# Patient Record
Sex: FEMALE | Race: WHITE | URBAN - METROPOLITAN AREA
[De-identification: names, ages, dates, MRNs, and addresses within clinical notes are randomized per-mention and may not be internally consistent; named-entity substitution may affect disease eponyms.]

---

## 2017-11-09 ENCOUNTER — OFFICE VISIT (OUTPATIENT)
Dept: ORTHOPEDIC SURGERY | Age: 57
End: 2017-11-09

## 2017-11-09 DIAGNOSIS — M79.604 RIGHT LEG PAIN: Primary | ICD-10-CM

## 2017-11-09 PROCEDURE — 99243 OFF/OP CNSLTJ NEW/EST LOW 30: CPT | Performed by: INTERNAL MEDICINE

## 2017-11-09 PROCEDURE — E0100 CANE ADJUST/FIXED WITH TIP: HCPCS | Performed by: INTERNAL MEDICINE

## 2017-11-10 NOTE — PROGRESS NOTES
Chief Complaint:   Chief Complaint   Patient presents with    Leg Pain     New. R thigh pain          History of Present Illness:       Patient is a 59 y.o. female presents with the above complaint. Patient is seen in consultation at the request of Polo Palomares CNP. The symptoms began January 2017 and started without an injury. The patient describes a aching pain that does radiate. The symptoms are constant  and are are worsening since the onset. She localizes pain to the medial compartment region of the right thigh and her symptoms are predictably worsen with weightbearing and alleviated with recumbency. She has been unable to work consecutive days as a  since sometime in September secondary to his claim pain levels and mild disability. She rates the pain as 7/10 severity workup to date has included x-rays of the right femur and right knee x-rays are not available for direct review however report is detailed below referencing mild tricompartmental osteoarthrosis of the right knee and mild right hip arthrosis    The patient denies any new onset or progressive weakness of the lower extremity she denies any neuritic character symptoms and denies any associated back pain. There is been no local bruising or swelling of the muscle compartment in this region. MRI is contraindicated in her case relative to sacral nerve stimulator. She denies any new onset bowel or bladder dysfunction she has no history of recent or remote pelvic or lumbar spinal trauma    She denies any mechanical symptoms or subjective instability no significant component of rest stiffness about the hip or knee joints.   Despite trials of Naprosyn and intermittent use of Robaxin symptoms remain problematic she will like to consider other options for treatment    She denies any constitutional symptoms     Past Medical History:        Past Medical History:   Diagnosis Date    Hyperlipidemia     Thyroid disease          Past tenderness over the medial compartment of the thigh and this apparently reproduces her pain however there is no palpable induration hematoma or rent within the abductor tendon complex      Range of Motion:  Full range and symmetric mild pain localizing to the medial side with internal and external rotation      Strength:  Hip flexor strength normal low-grade discomfort-right      Special Tests:  Stinchfield test positive      Skin: There are no rashes, ulcerations or lesions. Gait: Minimally antalgic      Reflex intact lower     Additional Comments:        Additional Examinations:           Lower Back: Examination of the lower back does not show any tenderness, deformity or injury. Range of motion is unremarkable. There is no gross instability. There are no rashes, ulcerations or lesions. Strength and tone are normal.   Neurolgic -Light touch sensation and manual muscle testing normal L2-S1. No fasiculations. Pattella tendon and Achilles tendon reflexes +2 bilaterally. Seated SLR negative          Office Imaging Results/Procedures PerformedToday:             Office Procedures:     Orders Placed This Encounter   Procedures    Cane Medline     Patient was prescribed a Medline Cane. This mobility device is required for the following reasons:    Patient has mobility limitations that significantly impair their ability to participate in one or more mobility related activities of daily living. The patient is able to safely use the mobility device. Functional mobility deficit can be sufficiently resolved with the use of this device. Verbal and written instructions for the use of and application of this item were provided. The patient was educated and fit by a healthcare professional with expert knowledge and specialization in brace application while under the direct supervision of the treating physician.   They were instructed to contact the office immediately should the equipment result in increased pain, given the long-standing duration of symptoms. I think it is highly probable that the main pain generator is of intra-articular etiology involving the right hip. Weightbearing x-rays were performed to assess the right hip on follow-up and we will proceed with diagnostic/therapeutic injection of the right hip   If this diagnostic test is negative then we will consider a radicular etiology of her pain and obtain EMG and or lumbar spine CT scan    The nature of the finding, probable diagnosis and likely treatment was thoroughly discussed with the patient. The options, risks, complications, alternative treatment as well as some of the differential diagnosis was discussed. The patient was thoroughly informed and all questions were answered. the patient indicated understanding and satisfaction with the discussion. Orders:        Orders Placed This Encounter   Procedures    Cane Medline     Patient was prescribed a Medline Cane. This mobility device is required for the following reasons:    Patient has mobility limitations that significantly impair their ability to participate in one or more mobility related activities of daily living. The patient is able to safely use the mobility device. Functional mobility deficit can be sufficiently resolved with the use of this device. Verbal and written instructions for the use of and application of this item were provided. The patient was educated and fit by a healthcare professional with expert knowledge and specialization in brace application while under the direct supervision of the treating physician. They were instructed to contact the office immediately should the equipment result in increased pain, decreased sensation, increased swelling or worsening of the condition. Disclaimer: \"This note was dictated with voice recognition software. Though review and correction are routine, we apologize for any errors. \"

## 2017-11-13 ENCOUNTER — TELEPHONE (OUTPATIENT)
Dept: ORTHOPEDIC SURGERY | Age: 57
End: 2017-11-13

## 2017-11-14 ENCOUNTER — TELEPHONE (OUTPATIENT)
Dept: ORTHOPEDIC SURGERY | Age: 57
End: 2017-11-14

## 2017-11-14 ENCOUNTER — OFFICE VISIT (OUTPATIENT)
Dept: ORTHOPEDIC SURGERY | Age: 57
End: 2017-11-14

## 2017-11-14 DIAGNOSIS — M25.551 RIGHT HIP PAIN: ICD-10-CM

## 2017-11-14 DIAGNOSIS — M16.11 PRIMARY OSTEOARTHRITIS OF RIGHT HIP: Primary | ICD-10-CM

## 2017-11-14 DIAGNOSIS — S76.019A HIP STRAIN, INITIAL ENCOUNTER: ICD-10-CM

## 2017-11-14 PROCEDURE — 99213 OFFICE O/P EST LOW 20 MIN: CPT | Performed by: INTERNAL MEDICINE

## 2017-11-14 PROCEDURE — 20611 DRAIN/INJ JOINT/BURSA W/US: CPT | Performed by: INTERNAL MEDICINE

## 2017-11-14 PROCEDURE — 73502 X-RAY EXAM HIP UNI 2-3 VIEWS: CPT | Performed by: INTERNAL MEDICINE

## 2017-11-14 NOTE — PROGRESS NOTES
Chief Complaint:   Chief Complaint   Patient presents with    Hip Pain     f/u R. hip/thigh pain. CSI inj          History of Present Illness:       Patient is a 59 y.o. female presents with the above complaint. Patient returns in follow-up and overall symptoms show no appreciable change. No new injuries no new events she continues to localize pain about the medial growing medial thigh symptoms are typically worsened/perfectly worsened with weightbearing activity. No new injuries no new events    She denies any recent constitutional symptoms  She has no history of coagulopathy       Past Medical History:        Past Medical History:   Diagnosis Date    Hyperlipidemia     Thyroid disease          Past Surgical History:   Procedure Laterality Date    CYSTOSCOPY  2/25/13    CYSTOSCOPY URETHRAL DILATATION HYDRODISTENTION OF BLADDER    KIDNEY REMOVAL Left          Present Medications:         Current Outpatient Prescriptions   Medication Sig Dispense Refill    levothyroxine (SYNTHROID) 88 MCG tablet Take 88 mcg by mouth Daily.  atorvastatin (LIPITOR) 40 MG tablet Take 40 mg by mouth daily.  citalopram (CELEXA) 20 MG tablet Take 20 mg by mouth daily. No current facility-administered medications for this visit. Allergies: Allergies   Allergen Reactions    Phenergan [Promethazine Hcl]      Out of my head \"loopY\"    Sulfa Antibiotics Rash        Social History:         Social History     Social History    Marital status:      Spouse name: N/A    Number of children: N/A    Years of education: N/A     Occupational History    Not on file.      Social History Main Topics    Smoking status: Never Smoker    Smokeless tobacco: Never Used    Alcohol use No    Drug use: No    Sexual activity: Yes     Partners: Male     Other Topics Concern    Not on file     Social History Narrative    No narrative on file        Review of Symptoms:    Pertinent items are noted in HPI   b   Vital Signs: There were no vitals filed for this visit. General Exam:     Constitutional: Patient is adequately groomed with no evidence of malnutrition          Physical Exam: right hip       Primary Exam:    Inspection:  No deformity atrophy or appreciableeffusion      Palpation:  No focal tenderness      Range of Motion:  Stable change from previous mild pain with flexion/abduction-FADIR reproducing her medial thigh pain      Strength:  Stable change from previous      Special Tests:  Stinchfield test positive, FADIR positive      Skin: There are no rashes, ulcerations or lesions. Gait: minimally antalgic      Neurovascular - non focal and intact       Additional Comments:        Additional Examinations:                  Office Imaging Results/Procedures PerformedToday:      Radiology:      X-rays obtained and reviewed in office:   Views 2 views   Location right hip   Impression there is suggestion of mild asymmetric joint space narrowing centrally at the femoral acetabular joint on the right as compared to left. Evidence of sacral nerve stimulator overlying the right hemipelvis. Surgical staples noted left hemipelvis. Anterior posterior columns of the acetabulum have a normal radiographic appearance normal trabecular pattern of the femur negative crossover sign. No stigmata of VALENTIN or DDH       Office Procedures:     Orders Placed This Encounter   Procedures    XR HIP RIGHT (2-3 VIEWS)     03049     Order Specific Question:   Reason for exam:     Answer:   Pain       Ultrasound-guided intra-articular hip injection    Logic E ultrasound  4 MHz    The patient was positioned supine on examination table and the  right    lower extremity was slightly abducted. The curvilinear probe was positioned in the medial groin region transversely. Diagnostic ultrasound was performed verifying the position of the neurovascular bundle in short axis with [place] confirmation.     The probe was then translated laterally and the anterior surface of the femoral head was visualized. The probe was then rotated clockwise to coronal oblique position and the femoral acetabular joint and anterior joint recess at the femoral head/neck junction was visualized. The femoral head neck junction was visualized at approximately 6 cm. Image optimization was obtained    The position of the probe was marked. Sterile preparation was performed. The subcutaneous tissues were anesthetized using 25-gauge 4 cm needle advanced under direct guidance. The needle was then exchanged for a 22-gauge spinal needle and this was advanced under direct guidance using longitudinal technique to the anterior joint recess. A total of 9 mL of Marcaine was utilized. The syringe was exchanged and under direct guidance 2 mL of Celestone 3 mL of Marcaine was injected into the anterior joint recess at the femoral acetabular joint. The anterior capsule was visualized to hydrodissect. Needle was withdrawn pressure applied to the puncture wound. Technically successful intra-articular injection of the hip    Definite and aesthetic response postinjection    Other Outside Imaging and Testing Personally Reviewed:       none          Assessment   Impression: . Encounter Diagnoses   Name Primary?  Primary osteoarthritis of right hip Yes    Hip strain, initial encounter     Right hip pain               Plan:        postinjection protocol  Activity modification cautioned against overuse  Continue medical pain management as per previous with scheduled NSAIDs discontinue after 5 days to when necessary use only  Allow her to return/attempted full  Duty return to work next Wednesday follow-up 10-14 days  Formal course of PT right hip when necessary pending her response to the injection        Are all the main pain generator appears to be consistent  With intra-articular etiology right femoral acetabular joint proceed as outlined above.       The nature of the finding, probable diagnosis and likely treatment was thoroughly discussed with the patient. The options, risks, complications, alternative treatment as well as some of the differential diagnosis was discussed. The patient was thoroughly informed and all questions were answered. the patient indicated understanding and satisfaction with the discussion. Orders:        Orders Placed This Encounter   Procedures    XR HIP RIGHT (2-3 VIEWS)     [id number]     Order Specific Question:   Reason for exam:     Answer:   Pain           Disclaimer: \"This note was dictated with voice recognition software. Though review and correction are routine, we apologize for any errors. \"

## 2017-11-14 NOTE — LETTER
[facility]  [address] [address]  Phone: [phone]  Fax: [phone]    Galilea Lindsay MD        November 14, 2017     Patient: Karolina De Los Santos   YOB: 1960   Date of Visit: 11/14/2017       To Whom It May Concern: It is my medical opinion that Karolina De Los Santos should remain out of work until 11/21/17. May return to full duty on 11/22/17. If you have any questions or concerns, please don't hesitate to call.     Sincerely,      Caleb Sam MD.

## 2017-11-14 NOTE — LETTER
[facility]  [address] [address]  Phone: [phone]  Fax: [phone]    Chacha Braga MD        November 14, 2017     Patient: Ryan Mccrary   YOB: 1960   Date of Visit: 11/14/2017       To Whom It May Concern: It is my medical opinion that Ryan Mccrary should remain out of work until 11/22/17. May return to [place] [address] on 11/23/17. If you have any questions or concerns, please don't hesitate to call.     Sincerely,      Chacha Braga MD

## 2017-11-20 ENCOUNTER — TELEPHONE (OUTPATIENT)
Dept: ORTHOPEDIC SURGERY | Age: 57
End: 2017-11-20

## 2017-11-22 ENCOUNTER — TELEPHONE (OUTPATIENT)
Dept: ORTHOPEDIC SURGERY | Age: 57
End: 2017-11-22

## 2017-11-22 NOTE — TELEPHONE ENCOUNTER
Dbmichelle Fortunato was having increased hip pain today and did not return to work, she would like a note for today

## 2017-11-28 ENCOUNTER — OFFICE VISIT (OUTPATIENT)
Dept: ORTHOPEDIC SURGERY | Age: 57
End: 2017-11-28

## 2017-11-28 DIAGNOSIS — S76.019A HIP STRAIN, INITIAL ENCOUNTER: ICD-10-CM

## 2017-11-28 DIAGNOSIS — M16.11 PRIMARY OSTEOARTHRITIS OF RIGHT HIP: Primary | ICD-10-CM

## 2017-11-28 PROCEDURE — 99213 OFFICE O/P EST LOW 20 MIN: CPT | Performed by: INTERNAL MEDICINE

## 2017-11-28 RX ORDER — MELOXICAM 15 MG/1
15 TABLET ORAL DAILY
Qty: 30 TABLET | Refills: 2 | Status: SHIPPED | OUTPATIENT
Start: 2017-11-28 | End: 2017-12-14 | Stop reason: ALTCHOICE

## 2017-11-28 RX ORDER — AMLODIPINE BESYLATE 5 MG/1
5 TABLET ORAL
COMMUNITY
Start: 2017-11-02 | End: 2017-12-20

## 2017-11-28 RX ORDER — DULOXETIN HYDROCHLORIDE 20 MG/1
CAPSULE, DELAYED RELEASE ORAL
COMMUNITY
Start: 2017-11-25 | End: 2017-12-20

## 2017-11-29 NOTE — PROGRESS NOTES
Chief Complaint:   Chief Complaint   Patient presents with    Hip Pain     f/u R thigh/hip pain          History of Present Illness:       Patient is a 59 y.o. female returns follow up for the above complaint. The patient was last seen approximately 2 weeksago. The symptoms overall improved but resurfacing since the last visit. The patient has had no further testing for the problem. Is related primarily to some increase activity/walking activity over the weekend. There was no acute event. The pain when symptomatic has now been localizing to the lateral hip region rather than the medial thigh/groin region. Her typical medial growing/thigh pain has  Abated since the intra-articular steroid injection and she noted definite therapeutic benefit from this    She is concerned that the effects may be diminishing    She denies any new onset mechanical symptoms or subjective instability she would also like to attempt return to work she delayed the return relative to her increasing low-grade symptoms     Past Medical History:        Past Medical History:   Diagnosis Date    Hyperlipidemia     Thyroid disease         Present Medications:         Current Outpatient Prescriptions   Medication Sig Dispense Refill    amLODIPine (NORVASC) 5 MG tablet Take 5 mg by mouth      DULoxetine (CYMBALTA) 20 MG extended release capsule       meloxicam (MOBIC) 15 MG tablet Take 1 tablet by mouth daily 30 tablet 2    levothyroxine (SYNTHROID) 88 MCG tablet Take 88 mcg by mouth Daily.  atorvastatin (LIPITOR) 40 MG tablet Take 40 mg by mouth daily.  citalopram (CELEXA) 20 MG tablet Take 20 mg by mouth daily. No current facility-administered medications for this visit. Allergies: Allergies   Allergen Reactions    Phenergan [Promethazine Hcl]      Out of my head \"loopY\"    Sulfa Antibiotics Rash           Review of Systems:    Pertinent items are noted in HPI       Vital Signs:     There were no vitals filed for this visit. General Exam:     Constitutional: Patient is adequately groomed with no evidence of malnutrition    Physical Exam: right hip      Primary Exam:    Inspection:  Deformity atrophy or effusion      Palpation:  No focal tenderness      Range of Motion:  Full range and only low-grade discomfort with end range internal and external rotation      Strength:  Normal with SLR      Special Tests:  Stinchfield test negative      Skin: There are no rashes, ulcerations or lesions. Neurovascular - non focal and intact  Gait: Minimally antalgic     Additional Comments:        Additional Examinations:                   Office Imaging Results/Procedures PerformedToday:             Office Procedures:   No orders of the defined types were placed in this encounter. Other Outside Imaging and Testing Personally Reviewed:       none          Assessment   Impression: . Encounter Diagnoses   Name Primary?  Primary osteoarthritis of right hip Yes    Hip strain, initial encounter               Plan: Activity modification cautioned against overuse and cane assisted weightbearing for the remainder of the week  Transition to meloxicam from Naprosyn for hopeful therapeutic effects/better efficacy  Consider CT arthrogram of the right hip when necessary if symptoms resurface/worsening or showing no considerable change evaluate the articular surface and rule out possibility of labral pathology         Orders:      No orders of the defined types were placed in this encounter. Monique Villeda MD.      Disclaimer: \"This note was dictated with voice recognition software. Though review and correction are routine, we apologize for any errors. \"

## 2017-12-04 ENCOUNTER — OFFICE VISIT (OUTPATIENT)
Dept: ORTHOPEDIC SURGERY | Age: 57
End: 2017-12-04

## 2017-12-04 DIAGNOSIS — S76.019A HIP STRAIN, INITIAL ENCOUNTER: ICD-10-CM

## 2017-12-04 DIAGNOSIS — M16.11 PRIMARY OSTEOARTHRITIS OF RIGHT HIP: Primary | ICD-10-CM

## 2017-12-04 PROCEDURE — 99213 OFFICE O/P EST LOW 20 MIN: CPT | Performed by: INTERNAL MEDICINE

## 2017-12-04 NOTE — PROGRESS NOTES
Chief Complaint:   Chief Complaint   Patient presents with    Hip Pain     f/u hip/thigh pain          History of Present Illness:       Patient is a 59 y.o. female returns follow up for the above complaint. The patient was last seen approximately 1 weeksago. The symptoms are worsening since the last visit. The patient has had no further testing for the problem. Fortunately symptoms are showing no appreciable change remain problematic the thigh pain/medial thigh pain and groin pain is resurfaced and becoming more pervasive. N    Symptoms are symptoms are particularly worsened with weightbearing she denies any new onset of progressive since lower extremity she denies any neuritic character symptoms    She denies any new onset mechanical symptoms she was unable to return to work relative to increasing pain levels     Past Medical History:        Past Medical History:   Diagnosis Date    Hyperlipidemia     Thyroid disease         Present Medications:         Current Outpatient Prescriptions   Medication Sig Dispense Refill    amLODIPine (NORVASC) 5 MG tablet Take 5 mg by mouth      DULoxetine (CYMBALTA) 20 MG extended release capsule       meloxicam (MOBIC) 15 MG tablet Take 1 tablet by mouth daily 30 tablet 2    levothyroxine (SYNTHROID) 88 MCG tablet Take 88 mcg by mouth Daily.  atorvastatin (LIPITOR) 40 MG tablet Take 40 mg by mouth daily.  citalopram (CELEXA) 20 MG tablet Take 20 mg by mouth daily. No current facility-administered medications for this visit. Allergies: Allergies   Allergen Reactions    Phenergan [Promethazine Hcl]      Out of my head \"loopY\"    Sulfa Antibiotics Rash           Review of Systems:    Pertinent items are noted in HPI      Vital Signs: There were no vitals filed for this visit.      General Exam:     Constitutional: Patient is adequately groomed with no evidence of malnutrition    Physical Exam: right hip      Primary Exam:    Inspection:

## 2017-12-05 ENCOUNTER — TELEPHONE (OUTPATIENT)
Dept: ORTHOPEDIC SURGERY | Age: 57
End: 2017-12-05

## 2017-12-05 NOTE — TELEPHONE ENCOUNTER
Faxed completed APS (right hip), RTW Note dated 12/4/2017 and OV Notes dated 11/28/2017 and 12/4/2017 to [facility] @ [phone].

## 2017-12-08 ENCOUNTER — TELEPHONE (OUTPATIENT)
Dept: ORTHOPEDIC SURGERY | Age: 57
End: 2017-12-08

## 2017-12-08 NOTE — LETTER
[facility] [address]  Phone: [phone]  Fax: [phone]    Sabine Richards MA        December 8, 2017     Patient: Arin Henley   YOB: 1960   Date of Visit: 12/8/2017       To Whom It May Concern: It is my medical opinion that Arin Henley should remain out of work until 12/12/17. If you have any questions or concerns, please don't hesitate to call.     Sincerely,      Michelle Askew MD.

## 2017-12-11 ENCOUNTER — TELEPHONE (OUTPATIENT)
Dept: ORTHOPEDIC SURGERY | Age: 57
End: 2017-12-11

## 2017-12-11 NOTE — TELEPHONE ENCOUNTER
Shiela Baker had her ct scan done today, she said the injection really hurt and she thinks she will need tomorrow off as well

## 2017-12-11 NOTE — TELEPHONE ENCOUNTER
She can have tomorrow off no problem  And the next day if needed  Local measures for the pain ; we can call in some pain med if needed

## 2017-12-12 ENCOUNTER — TELEPHONE (OUTPATIENT)
Dept: ORTHOPEDIC SURGERY | Age: 57
End: 2017-12-12

## 2017-12-14 ENCOUNTER — OFFICE VISIT (OUTPATIENT)
Dept: ORTHOPEDIC SURGERY | Age: 57
End: 2017-12-14

## 2017-12-14 DIAGNOSIS — S76.019A HIP STRAIN, INITIAL ENCOUNTER: ICD-10-CM

## 2017-12-14 DIAGNOSIS — M16.11 PRIMARY OSTEOARTHRITIS OF RIGHT HIP: Primary | ICD-10-CM

## 2017-12-14 PROCEDURE — 99213 OFFICE O/P EST LOW 20 MIN: CPT | Performed by: INTERNAL MEDICINE

## 2017-12-14 RX ORDER — DICLOFENAC SODIUM 75 MG/1
75 TABLET, DELAYED RELEASE ORAL 2 TIMES DAILY
Qty: 60 TABLET | Refills: 1 | Status: SHIPPED | OUTPATIENT
Start: 2017-12-14 | End: 2018-03-01 | Stop reason: ALTCHOICE

## 2017-12-14 RX ORDER — TRAMADOL HYDROCHLORIDE 50 MG/1
50 TABLET ORAL EVERY 6 HOURS PRN
Qty: 30 TABLET | Refills: 0 | Status: SHIPPED | OUTPATIENT
Start: 2017-12-14 | End: 2018-02-13 | Stop reason: SDUPTHER

## 2017-12-14 NOTE — LETTER
[facility]  [address] [address]  Phone: [phone]  Fax: [phone]    Louis Ventura MD        December 14, 2017     Patient: Trish Daily   YOB: 1960   Date of Visit: 12/14/2017       To Whom It May Concern: It is my medical opinion that Trish Daily may return to full duty immediately with no restrictions on Monday 12/18/17    If you have any questions or concerns, please don't hesitate to call.     Sincerely,      Maurisio Lee MD.    Louis Ventura MD

## 2017-12-14 NOTE — PROGRESS NOTES
Chief Complaint:   Chief Complaint   Patient presents with    Hip Pain     f/u R hip/ thigh pain CT scan results           History of Present Illness:       Patient is a 59 y.o. female returns follow up for the above complaint. The patient was last seen approximately 2 weeksago. The symptoms show no change since the last visit. The patient has had further testing for the problem. In the interim CT scan with contrast was completed which is outlined below. Unfortunately her symptoms of hip pain persist average 6/10 severity    No mechanical symptoms pain localized to the medial groin/medial thigh and lateral hip when symptomatic symptoms are typically worsened with weightbearing     Past Medical History:        Past Medical History:   Diagnosis Date    Hyperlipidemia     Thyroid disease         Present Medications:         Current Outpatient Prescriptions   Medication Sig Dispense Refill    diclofenac (VOLTAREN) 75 MG EC tablet Take 1 tablet by mouth 2 times daily 60 tablet 1    traMADol (ULTRAM) 50 MG tablet Take 1 tablet by mouth every 6 hours as needed for Pain . 30 tablet 0    amLODIPine (NORVASC) 5 MG tablet Take 5 mg by mouth      DULoxetine (CYMBALTA) 20 MG extended release capsule       levothyroxine (SYNTHROID) 88 MCG tablet Take 88 mcg by mouth Daily.  atorvastatin (LIPITOR) 40 MG tablet Take 40 mg by mouth daily.  citalopram (CELEXA) 20 MG tablet Take 20 mg by mouth daily. No current facility-administered medications for this visit. Allergies: Allergies   Allergen Reactions    Phenergan [Promethazine Hcl]      Out of my head \"loopY\"    Sulfa Antibiotics Rash           Review of Systems:    Pertinent items are noted in HPI       Vital Signs: There were no vitals filed for this visit.      General Exam:     Constitutional: Patient is adequately groomed with no evidence of malnutrition    Physical Exam: right hip      Primary Exam:    Inspection:  No deformity

## 2017-12-18 ENCOUNTER — OFFICE VISIT (OUTPATIENT)
Dept: ORTHOPEDIC SURGERY | Age: 57
End: 2017-12-18

## 2017-12-18 ENCOUNTER — TELEPHONE (OUTPATIENT)
Dept: ORTHOPEDIC SURGERY | Age: 57
End: 2017-12-18

## 2017-12-18 DIAGNOSIS — S76.019A HIP STRAIN, INITIAL ENCOUNTER: ICD-10-CM

## 2017-12-18 DIAGNOSIS — M16.11 PRIMARY OSTEOARTHRITIS OF RIGHT HIP: Primary | ICD-10-CM

## 2017-12-18 PROCEDURE — 99213 OFFICE O/P EST LOW 20 MIN: CPT | Performed by: INTERNAL MEDICINE

## 2017-12-18 NOTE — PROGRESS NOTES
Chief Complaint:   Chief Complaint   Patient presents with    Hip Pain     f/u R hip pain          History of Present Illness:       Patient is a 59 y.o. female returns follow up for the above complaint. The patient was last seen approximately 4 daysago. The symptoms are worsening since the last visit. The patient has had no further testing for the problem. Of partially symptoms show no appreciable change in fact seem to be worsening with respect to pain and inability to achieve normal function with ADLs. Joint of her pain localizes to the proximal and proximal medial thigh and aching in quality-right    No mechanical symptoms     Past Medical History:        Past Medical History:   Diagnosis Date    Hyperlipidemia     Thyroid disease         Present Medications:         Current Outpatient Prescriptions   Medication Sig Dispense Refill    traMADol (ULTRAM) 50 MG tablet Take 1 tablet by mouth every 6 hours as needed for Pain . 30 tablet 0    amLODIPine (NORVASC) 5 MG tablet Take 5 mg by mouth      DULoxetine (CYMBALTA) 20 MG extended release capsule       levothyroxine (SYNTHROID) 88 MCG tablet Take 88 mcg by mouth Daily.  atorvastatin (LIPITOR) 40 MG tablet Take 40 mg by mouth daily.  citalopram (CELEXA) 20 MG tablet Take 20 mg by mouth daily. No current facility-administered medications for this visit. Allergies: Allergies   Allergen Reactions    Phenergan [Promethazine Hcl]      Out of my head \"loopY\"    Sulfa Antibiotics Rash           Review of Systems:    Pertinent items are noted in HPI       Vital Signs: There were no vitals filed for this visit.      General Exam:     Constitutional: Patient is adequately groomed with no evidence of malnutrition    Physical Exam: right hip      Primary Exam:    Inspection:  No deformity atrophy or effusion      Palpation:  No focal trigger point tenderness      Range of Motion:  Stable unchanged from previous pain with flexion

## 2017-12-19 ENCOUNTER — TELEPHONE (OUTPATIENT)
Dept: ORTHOPEDIC SURGERY | Age: 57
End: 2017-12-19

## 2017-12-20 ENCOUNTER — TELEPHONE (OUTPATIENT)
Dept: ORTHOPEDIC SURGERY | Age: 57
End: 2017-12-20

## 2017-12-20 ENCOUNTER — OFFICE VISIT (OUTPATIENT)
Dept: ORTHOPEDIC SURGERY | Age: 57
End: 2017-12-20

## 2017-12-20 VITALS
DIASTOLIC BLOOD PRESSURE: 103 MMHG | HEART RATE: 93 BPM | HEIGHT: 64 IN | WEIGHT: 198.8 LBS | SYSTOLIC BLOOD PRESSURE: 155 MMHG | BODY MASS INDEX: 33.94 KG/M2

## 2017-12-20 DIAGNOSIS — M84.350A STRESS FRACTURE OF PELVIS, INITIAL ENCOUNTER: ICD-10-CM

## 2017-12-20 DIAGNOSIS — M81.0 OSTEOPOROSIS, UNSPECIFIED OSTEOPOROSIS TYPE, UNSPECIFIED PATHOLOGICAL FRACTURE PRESENCE: Primary | ICD-10-CM

## 2017-12-20 DIAGNOSIS — M25.551 RIGHT HIP PAIN: Primary | ICD-10-CM

## 2017-12-20 PROBLEM — M16.11 PRIMARY OSTEOARTHRITIS OF RIGHT HIP: Status: RESOLVED | Noted: 2017-11-14 | Resolved: 2017-12-20

## 2017-12-20 PROBLEM — M80.80XA OTHER OSTEOPOROSIS WITH CURRENT PATHOLOGICAL FRACTURE: Status: ACTIVE | Noted: 2017-12-20

## 2017-12-20 PROCEDURE — 99214 OFFICE O/P EST MOD 30 MIN: CPT | Performed by: ORTHOPAEDIC SURGERY

## 2017-12-20 RX ORDER — OMEPRAZOLE 20 MG/1
CAPSULE, DELAYED RELEASE ORAL
Refills: 3 | COMMUNITY
Start: 2017-10-03 | End: 2020-11-02

## 2017-12-20 RX ORDER — LEVOTHYROXINE SODIUM 0.1 MG/1
TABLET ORAL
Refills: 0 | COMMUNITY
Start: 2017-12-06

## 2017-12-20 RX ORDER — ALENDRONATE SODIUM 70 MG/1
70 TABLET ORAL
Qty: 4 TABLET | Refills: 3 | Status: SHIPPED | OUTPATIENT
Start: 2017-12-20 | End: 2018-03-01

## 2017-12-20 NOTE — PROGRESS NOTES
Date of Service: 12/20/2017  Chief Complaint    Right Medial thigh Pain       History of Present Illness:  Rhonda Cary is a 59 y.o. female referred by Dr. Alyson Fernandez for evaluation of her right hip / thigh pain and consideration for total hip replacement. She denies any direct injury to the hip or pelvis. She has been off work due to the pain since September and is currently off until 1/8/18. She reports the pain over her medial thigh with occasional numbness along the posterior medial knee region. She also gets some pain at the posterior lateral aspect of her right hip. She's had a previous bladder stimulator placed in 2013 and revised in 2015 from the posterior aspect of the side. Reports that it seems to be functioning well as far as her bladder control. She is undergone physical therapy as well as ultrasound guided hip joint injection by Dr. Alejandra Campbell which provided absolutely no change in her symptoms. She has trialed muscle relaxants and multiple nonsteroidal anti-inflammatories also without significant benefit. Tramadol has provided little bit of benefit. The pain affects her more when she is standing than when she is sitting. Pain does interfere with her sleep. She denies any loss of coordination of the right lower extremity. Current pain level averages 8/10 and is sharp and achy. Medical History:  Current Outpatient Prescriptions   Medication Sig Dispense Refill    alendronate (FOSAMAX) 70 MG tablet Take 1 tablet by mouth every 7 days 4 tablet 3    traMADol (ULTRAM) 50 MG tablet Take 1 tablet by mouth every 6 hours as needed for Pain . 30 tablet 0    levothyroxine (SYNTHROID) 100 MCG tablet TAKE 1 TABLET (100 MCG TOTAL) BY MOUTH DAILY. 0    omeprazole (PRILOSEC) 20 MG delayed release capsule TAKE 1 CAPSULE (20 MG TOTAL) BY MOUTH DAILY. 3     No current facility-administered medications for this visit.       Past Medical History:   Diagnosis Date    Hyperlipidemia     Thyroid improve with physical therapy. She does have some findings suggesting some degenerative changes at her pubic symphysis which could be aggravating her adductor compartment. She had no hip groin pain with hip flexion and internal/external rotation testing which also did not indicate significant arthritic pathology causing her pain in the hip. I do not believe that simply jumping to a hip replacement is in her best interest.  While her bone mineral density was within normal limits 2 years ago she may have declined in activities and she could have pain related to a stress response in her pubic rami area. Especially since this is directly tender to palpation. I explained to her that one option will be to start her on a bisphosphonate medication which could result in improvement in her symptoms. She was willing to give this a try. I reviewed with her the common side effects and risks of this medication. She understands the GI irritation is most common and that if she has problems with this there other means to deliver the medication through IV form if necessary. She will give this a try over the next 6-8 weeks. She'll continue off of her work until her planned return on January 8. Certainly if things worsen we may also consider a 3-phase bone scan to evaluate areas of stress in the pelvis. She understands and accepts this course of care.

## 2017-12-21 NOTE — TELEPHONE ENCOUNTER
Pt called back, and said she missed the call. I gave her the message about holding off on the bone density. This is done.

## 2017-12-21 NOTE — TELEPHONE ENCOUNTER
Pt said she has not started the fosamax yet and is today. She will try that for a bit and let us know how it helps.    She understood this plan

## 2017-12-27 NOTE — TELEPHONE ENCOUNTER
As I told her in the office, it will likely take 2 months of fosamax to make a large difference in her symptoms. She can have the DEXA scan at any time.

## 2017-12-28 NOTE — TELEPHONE ENCOUNTER
Spoke to patient and relayed Dr Nicholson's message. She requested to go ahead and have a dexa scan. Order was placed.

## 2017-12-29 ENCOUNTER — HOSPITAL ENCOUNTER (OUTPATIENT)
Dept: GENERAL RADIOLOGY | Age: 57
Discharge: HOME | End: 2017-12-29
Attending: ORTHOPAEDIC SURGERY | Admitting: ORTHOPAEDIC SURGERY

## 2017-12-29 DIAGNOSIS — M81.0 OSTEOPOROSIS, UNSPECIFIED OSTEOPOROSIS TYPE, UNSPECIFIED PATHOLOGICAL FRACTURE PRESENCE: ICD-10-CM

## 2017-12-29 DIAGNOSIS — M81.0 AGE-RELATED OSTEOPOROSIS WITHOUT CURRENT PATHOLOGICAL FRACTURE: ICD-10-CM

## 2018-01-03 ENCOUNTER — TELEPHONE (OUTPATIENT)
Dept: ORTHOPEDIC SURGERY | Age: 58
End: 2018-01-03

## 2018-01-03 DIAGNOSIS — M84.350A STRESS FRACTURE OF PELVIS, INITIAL ENCOUNTER: ICD-10-CM

## 2018-01-03 DIAGNOSIS — M81.0 OSTEOPOROSIS, UNSPECIFIED OSTEOPOROSIS TYPE, UNSPECIFIED PATHOLOGICAL FRACTURE PRESENCE: Primary | ICD-10-CM

## 2018-01-03 DIAGNOSIS — M16.11 PRIMARY OSTEOARTHRITIS OF RIGHT HIP: ICD-10-CM

## 2018-01-04 ENCOUNTER — OFFICE VISIT (OUTPATIENT)
Dept: ORTHOPEDIC SURGERY | Age: 58
End: 2018-01-04

## 2018-01-04 DIAGNOSIS — M16.11 PRIMARY OSTEOARTHRITIS OF RIGHT HIP: Primary | ICD-10-CM

## 2018-01-04 PROBLEM — S76.019A HIP STRAIN, INITIAL ENCOUNTER: Status: RESOLVED | Noted: 2017-11-14 | Resolved: 2018-01-04

## 2018-01-04 PROBLEM — M84.350A STRESS FRACTURE OF PELVIS: Status: RESOLVED | Noted: 2017-12-20 | Resolved: 2018-01-04

## 2018-01-04 PROCEDURE — 99214 OFFICE O/P EST MOD 30 MIN: CPT | Performed by: INTERNAL MEDICINE

## 2018-01-04 NOTE — PROGRESS NOTES
Chief Complaint:   Chief Complaint   Patient presents with    Hip Pain     f/u R hip pain          History of Present Illness:       Patient is a 59 y.o. female returns follow up for the above complaint. The patient was last seen approximately 1 monthsago. The symptoms show no change since the last visit. The patient has had no further testing for the problem. She was evaluated by Dr. Christin Bernal in the interim and the documentation from that visit was reviewed at length. He documented that she had insignificant response to the intra-articular hip injection and therefore attribute her pain to potential other etiologies. However, the ultrasound-guided intra-articular hip injection provided her dramatic relief however symptoms gradually resurfaced over a 2-4 week period of time. At that time, CT arthrogram was obtained and there was confirmation of moderate hip arthropathy without evidence of acute bony pathology affecting the right hemipelvis. In the interim bone density scan was obtained    Unfortunately symptoms of pain are unchanged localized to the proximal and anterior medial thigh worsen with weightbearing she does not experience a significant component of stiffness. No mechanical symptoms about the hip to    She denies any radiation of pain below the knee she has been unable to return to work secondary to pain    She continues a medical pain management as per previous OTC NSAIDs. She'll like to entertain other options for treatment     Past Medical History:        Past Medical History:   Diagnosis Date    Hyperlipidemia     Thyroid disease         Present Medications:         Current Outpatient Prescriptions   Medication Sig Dispense Refill    levothyroxine (SYNTHROID) 100 MCG tablet TAKE 1 TABLET (100 MCG TOTAL) BY MOUTH DAILY. 0    omeprazole (PRILOSEC) 20 MG delayed release capsule TAKE 1 CAPSULE (20 MG TOTAL) BY MOUTH DAILY.   3    alendronate (FOSAMAX) 70 MG tablet Take 1 tablet by mouth every 7 days 4 tablet 3     No current facility-administered medications for this visit. Allergies: Allergies   Allergen Reactions    Phenergan [Promethazine Hcl]      Out of my head \"loopY\"    Sulfa Antibiotics Rash           Review of Systems:    Pertinent items are noted in HPI         Vital Signs: There were no vitals filed for this visit. General Exam:     Constitutional: Patient is adequately groomed with no evidence of malnutrition    Physical Exam: right hip      Primary Exam:    Inspection:  No deformity atrophy or effusion      Palpation:  There is mild tenderness over the medial thigh compartment of different quality than character than her groin pain/anterior hip pain      Range of Motion:  Full range symmetric pain with end range hip flexion and mild with internal rotation      Strength:  Normal with SLR      Special Tests:  Stinchfield test positive      Skin: There are no rashes, ulcerations or lesions.       Gait: Minimally antalgic      Reflex intact lower     Additional Comments:        Additional Examinations:         Neurovascular - non focal and intact           Office Imaging Results/Procedures PerformedToday:          Office Procedures:     Orders Placed This Encounter   Procedures    Ambulatory referral to Physical Therapy     Referral Priority:   Routine     Referral Type:   Eval and Treat     Referral Reason:   Specialty Services Required     Requested Specialty:   Physical Therapy     Number of Visits Requested:   1           Other Outside Imaging and Testing Personally Reviewed:       none    EXAMINATION:   BONE DENSITOMETRY       12/29/2017 8:30 am       TECHNIQUE:   A bone density DEXA scan was performed of the lumbar spine, left hip, and   left forearm  on a Avtal24 system.       COMPARISON:   None.       HISTORY:   ORDERING PHYSICIAN PROVIDED HISTORY: Osteoporosis, unspecified osteoporosis   type, unspecified pathological fracture presence   TECHNOLOGIST PROVIDED HISTORY:   Technologist Provided Reason for Exam: Osteoporosis       A sacral nerve stimulator overlies the right femoral head.       FINDINGS:   LUMBAR SPINE:       The bone mineral density in the lumbar spine including the L1 through L4   levels is measured at 1.22 g/cm2, which corresponds to a T-score of 1.5 and a   Z-score of 2.9.  This is within the normal range by WHO criteria.       LEFT HIP:       The bone mineral density in the total hip is measured at 1.05 g/cm2   corresponding to a T-score of 0.9 and a Z-score of 1.8.  This is within the   normal range by [facility] criteria.       The bone mineral density of the femoral neck is measured at 0.86 g/cm2   corresponding to a T-score of 0.1 and a Z-score of 1.3.  This is within the   normal range by [facility] criteria.       LEFT FOREARM:       The bone mineral density in the total left forearm is measured at 0.58 g/cm2   corresponding to a T-score of -0.1 and a Z-score of 1.1.  This is within the   normal range by WHO criteria.       The bone mineral density of the middle 3rd of the radius is measured at 0.70   g/cm2 corresponding to a T-score of 0.1 and a Z-score of 1.4.  This is within   the normal range by [facility] criteria.           Impression   Normal bone density by WHO criteria. Assessment   Impression: . Encounter Diagnosis   Name Primary?  Primary osteoarthritis of right hip Yes              Plan: Activity modification cautioned against overuse and intermittent use of cane assisted ambulation for symptom control  Medical pain management as per previous  Remain off of work for the next 1 month  Formal course of PT/revisit formal course PT right hip  Consider her a candidate for biologic orthopedic injection-type III PRP injection at her discretion  Discontinue Fosamax      I believe her pain is of intra-articular etiology related to hip osteoarthritis and she had a diagnostic intra-articular injection that would support this.   Her medial compartment pain is more than likely muscular strain low clinical suspicion for other active pathology at this time     Orders:        Orders Placed This Encounter   Procedures    Ambulatory referral to Physical Therapy     Referral Priority:   Routine     Referral Type:   Eval and Treat     Referral Reason:   Specialty Services Required     Requested Specialty:   Physical Therapy     Number of Visits Requested:   1         Cris Erazo MD.      Reagan Link: \"This note was dictated with voice recognition software. Though review and correction are routine, we apologize for any errors. \"

## 2018-01-04 NOTE — LETTER
[facility]  [address] [address]  Phone: [phone]  Fax: [phone]    Sunshine Brito MD        January 4, 2018     Patient: Kait Vila   YOB: 1960   Date of Visit: 1/4/2018       To Whom It May Concern: It is my medical opinion that Kait Vila should remain out of work until 2/4/18. May return to full duty with no restrictions on 2/4/18    If you have any questions or concerns, please don't hesitate to call.     Sincerely,    Gabbi Amor MD.        Sunshine Brito MD

## 2018-01-08 ENCOUNTER — TELEPHONE (OUTPATIENT)
Dept: ORTHOPEDIC SURGERY | Age: 58
End: 2018-01-08

## 2018-01-08 NOTE — TELEPHONE ENCOUNTER
Patient calling back. Advised of message below. Patient would like to speak to someone regarding what the next test would be.

## 2018-01-12 ENCOUNTER — HOSPITAL ENCOUNTER (OUTPATIENT)
Dept: PHYSICAL THERAPY | Age: 58
Discharge: HOME | End: 2018-01-31
Admitting: INTERNAL MEDICINE

## 2018-01-17 ENCOUNTER — HOSPITAL ENCOUNTER (OUTPATIENT)
Dept: NUCLEAR MEDICINE | Age: 58
Discharge: HOME | End: 2018-01-17
Admitting: ORTHOPAEDIC SURGERY

## 2018-01-17 DIAGNOSIS — M84.350A STRESS FRACTURE OF PELVIS, INITIAL ENCOUNTER: ICD-10-CM

## 2018-01-17 DIAGNOSIS — M81.0 OSTEOPOROSIS, UNSPECIFIED OSTEOPOROSIS TYPE, UNSPECIFIED PATHOLOGICAL FRACTURE PRESENCE: ICD-10-CM

## 2018-01-17 DIAGNOSIS — M16.11 PRIMARY OSTEOARTHRITIS OF RIGHT HIP: ICD-10-CM

## 2018-01-17 DIAGNOSIS — M81.0 AGE-RELATED OSTEOPOROSIS WITHOUT CURRENT PATHOLOGICAL FRACTURE: ICD-10-CM

## 2018-01-17 RX ORDER — TC 99M MEDRONATE 20 MG/10ML
25 INJECTION, POWDER, LYOPHILIZED, FOR SOLUTION INTRAVENOUS
Status: COMPLETED | OUTPATIENT
Start: 2018-01-17 | End: 2018-01-17

## 2018-01-17 RX ADMIN — TC 99M MEDRONATE 25 MILLICURIE: 20 INJECTION, POWDER, LYOPHILIZED, FOR SOLUTION INTRAVENOUS at 10:38

## 2018-01-22 ENCOUNTER — TELEPHONE (OUTPATIENT)
Dept: ORTHOPEDIC SURGERY | Age: 58
End: 2018-01-22

## 2018-01-23 NOTE — TELEPHONE ENCOUNTER
Bone scan results did not show any arthritic change in her right hip. The potential for any bone stress in the center area of her pelvis is difficult to fully evaluate due to some of the shielding from her bladder stimulator implant. No evidence for any stress fracture in the hip region. Pelvic area may show some degeneration of the joint where her pelvis unites at the pubic symphysis but this is mild. There may be more role for her implanted bladder stimulator device causing the pain along the inside of her thigh.

## 2018-01-30 ENCOUNTER — HOSPITAL ENCOUNTER (OUTPATIENT)
Dept: PHYSICAL THERAPY | Age: 58
Discharge: HOME | End: 2018-01-30
Admitting: INTERNAL MEDICINE

## 2018-01-30 NOTE — FLOWSHEET NOTE
The [address] [place] [address] and [facility], [address] [place] [address], [address] [place] [address]  Phone: [phone]   Fax:     [phone]    Physical Therapy Daily Treatment Note  Date:  2018    Patient Name:  Vilma Gallego    :  1960  MRN: [id number]  Restrictions/Precautions:    Medical/Treatment Diagnosis Information:  · Diagnosis: M16.11 (ICD-10-CM) - Primary osteoarthritis of right hip  · Treatment Diagnosis: Right hip pain   Insurance/Certification information:  PT Insurance Information: [place]   Physician Information:  Referring Practitioner: Dr. Malissa Hannah of care signed (Y/N):     Date of Patient follow up with Physician:     G-Code (if applicable):      Date G-Code Applied: 18   PT G-Codes  Functional Assessment Tool Used: LEFS  Score: 16/80 - 80% deficit   Functional Limitation: Mobility: Walking and moving around  Mobility: Walking and Moving Around Current Status (): At least 80 percent but less than 100 percent impaired, limited or restricted  Mobility: Walking and Moving Around Goal Status ():  At least 1 percent but less than 20 percent impaired, limited or restricted    Progress Note:    Next due by: Visit #10       Latex Allergy:  [x]NO      []YES  Preferred Language for Healthcare:   [x]English       []other:    Visit # Insurance Allowable   1 60     Pain level:  6-710     SUBJECTIVE:  See eval    OBJECTIVE: See eval  Observation:   Test measurements:      RESTRICTIONS/PRECAUTIONS:     Exercises/Interventions:     ROM/STRETCHES     Supine hamstring w/ strap 5x30\"     Seated piriformis 2x30\"     Supine piriformis 3x30\"     Supine figure 4     Quad       ITB     Butterfly     Hip flexor stretch kneeling     PREs     SLR     Abduction - isometric 10x10\"     Adduction - isometric 10x10\"     Supine abduction with tband     Seated hip flexion with ER     clams     SL dead lift     Monster walks     Wall sit with tband

## 2018-01-30 NOTE — PLAN OF CARE
The [address]  Phone [phone]  Fax [phone]                                                         Physical Therapy Certification    Dear Referring Practitioner: Dr. Jazmin Moreno,    We had the pleasure of evaluating the following patient for physical therapy services at [address] [place] [address]. A summary of our findings can be found in the initial assessment below. This includes our plan of care. If you have any questions or concerns regarding these findings, please do not hesitate to contact me at the office phone number checked above. Thank you for the referral.       Physician Signature:_______________________________Date:__________________  By signing above (or electronic signature), therapists plan is approved by physician      Patient: Pamela Martell   : 1960   MRN: [id number]  Referring Physician: Referring Practitioner: Dr. Jazmin Moreno      Evaluation Date: 2018      Medical Diagnosis Information:  Diagnosis: M16.11 (ICD-10-CM) - Primary osteoarthritis of right hip   Treatment Diagnosis: Right hip pain                                          Insurance information: PT Insurance Information: [place]      Precautions/ Contra-indications:   Latex Allergy:  [x]NO      []YES  Preferred Language for Healthcare:   [x]English       []other:    SUBJECTIVE: Patient stated complaint: At the beginning of last year, pt started getting pain in the right hip and thigh. Pain got progressively worse by September. She does not recall any specific STEPHAN. She did get a cortisone injection in November with no real relief noted.       Imaging: Xrays in September/October, CT scan, and bone scan     Relevant Medical History: OA   Functional Disability Index:PT G-Codes  Functional Assessment Tool Used: LEFS  Score: 16/80 - 80% participation in work activities   []Reduced participation in social activities. []Reduced participation in sport activities. Classification :    []Signs/symptoms consistent with post-surgical status including decreased ROM, strength and function. []Signs/symptoms consistent with joint sprain/strain   []Signs/symptoms consistent with patella-femoral syndrome   [x]Signs/symptoms consistent with knee OA/hip OA   []Signs/symptoms consistent with internal derangement of knee/Hip   []Signs/symptoms consistent with functional hip weakness/NMR control      []Signs/symptoms consistent with tendinitis/tendinosis    []signs/symptoms consistent with pathology which may benefit from Dry needling      []other:      Prognosis/Rehab Potential:      []Excellent   [x]Good    []Fair   []Poor    Tolerance of evaluation/treatment:    []Excellent   [x]Good    []Fair   []Poor    Physical Therapy Evaluation Complexity Justification  [x] A history of present problem with:  [] no personal factors and/or comorbidities that impact the plan of care;  [x]1-2 personal factors and/or comorbidities that impact the plan of care  []3 personal factors and/or comorbidities that impact the plan of care  [x] An examination of body systems using standardized tests and measures addressing any of the following: body structures and functions (impairments), activity limitations, and/or participation restrictions;:  [] a total of 1-2 or more elements   [] a total of 3 or more elements   [x] a total of 4 or more elements   [x] A clinical presentation with:  [x] stable and/or uncomplicated characteristics   [] evolving clinical presentation with changing characteristics  [] unstable and unpredictable characteristics;   [x] Clinical decision making of [x] low, [] moderate, [] high complexity using standardized patient assessment instrument and/or measurable assessment of functional outcome.     [x] EVAL (LOW) 31774 (typically 20 minutes face-to-face)  [] EVAL (MOD) 29838 (typically 30 minutes face-to-face)  [] EVAL (HIGH) 49035 (typically 45 minutes face-to-face)  [] RE-EVAL       PLAN  Frequency/Duration:  1-2 days per week for 4 Weeks:  Interventions:  [x]  Therapeutic exercise including: strength training, ROM, for Lower extremity and core   [x]  NMR activation and proprioception for LE, Glutes and Core   [x]  Manual therapy as indicated for LE, Hip and spine to include: Dry Needling/IASTM, STM, PROM, Gr I-IV mobilizations, manipulation. [x] Modalities as needed that may include: thermal agents, E-stim, Biofeedback, US, iontophoresis as indicated  [x] Patient education on joint protection, postural re-education, activity modification, progression of HEP. HEP instruction: Provided written and verbal instructions (see scanned forms)    GOALS:  Patient stated goal: Get rid of pain     Therapist goals for Patient:   Short Term Goals: To be achieved in: 2 weeks  1. Independent in HEP and progression per patient tolerance, in order to prevent re-injury. 2. Patient will have a decrease in pain to facilitate improvement in movement, function, and ADLs as indicated by Functional Deficits. Long Term Goals: To be achieved in: 6-8 weeks  1. Disability index score of 20% or less for the LEFS to assist with reaching prior level of function. 2. Patient will demonstrate increased AROM to WNL to allow for proper joint functioning as indicated by patients Functional Deficits. 3. Patient will demonstrate an increase in Strength to good proximal hip strength and control in LE (MMT at least 4+/5) to allow for proper functional mobility as indicated by patients Functional Deficits. 4. Patient will return to daily functional activities without increased symptoms or restriction.    5. Patient will be able to stand > or = to 20 minutes without increased symptoms or restriction      Electronically signed by:  Adalid Brambila PT

## 2018-02-01 ENCOUNTER — HOSPITAL ENCOUNTER (OUTPATIENT)
Dept: PHYSICAL THERAPY | Age: 58
Discharge: HOME | End: 2018-02-28
Attending: INTERNAL MEDICINE | Admitting: INTERNAL MEDICINE

## 2018-02-01 ENCOUNTER — OFFICE VISIT (OUTPATIENT)
Dept: ORTHOPEDIC SURGERY | Age: 58
End: 2018-02-01

## 2018-02-01 ENCOUNTER — TELEPHONE (OUTPATIENT)
Dept: ORTHOPEDIC SURGERY | Age: 58
End: 2018-02-01

## 2018-02-01 DIAGNOSIS — M16.11 PRIMARY OSTEOARTHRITIS OF RIGHT HIP: Primary | ICD-10-CM

## 2018-02-01 PROCEDURE — 99213 OFFICE O/P EST LOW 20 MIN: CPT | Performed by: INTERNAL MEDICINE

## 2018-02-01 NOTE — PROGRESS NOTES
Chief Complaint:   Chief Complaint   Patient presents with    Hip Pain     f/u r hip          History of Present Illness:       Patient is a 59 y.o. female returns follow up for the above complaint. The patient was last seen approximately 1 monthsago. The symptoms   show no change since the last visit. The patient has had further testing for the problem. Overall symptoms are mildly improved. No significant increase in her level function however in the interim. She continues with intermittent use of crutches assisted/cane assisted weightbearing. She has started a formal course of physical therapy having completed just 1 session thus far    She continues to localize pain about the anterior medial groin and medial thigh and continues with a component of rest stiffness of mild severity    No mechanical symptoms    In the interim she underwent triple phase bone scan results as reviewed below revealing no evidence of active bony process involving the right hemipelvis    She was also evaluated by her urologist and there is no suspicion that the sacral nerve stimulator is cause for her symptoms       Present Medications:         Current Outpatient Prescriptions   Medication Sig Dispense Refill    levothyroxine (SYNTHROID) 100 MCG tablet TAKE 1 TABLET (100 MCG TOTAL) BY MOUTH DAILY. 0    omeprazole (PRILOSEC) 20 MG delayed release capsule TAKE 1 CAPSULE (20 MG TOTAL) BY MOUTH DAILY. 3    alendronate (FOSAMAX) 70 MG tablet Take 1 tablet by mouth every 7 days 4 tablet 3     No current facility-administered medications for this visit. Allergies: Allergies   Allergen Reactions    Phenergan [Promethazine Hcl]      Out of my head \"loopY\"    Sulfa Antibiotics Rash        Review of Symptoms:    Pertinent items are noted in HPI       Vital Signs: There were no vitals filed for this visit.      General Examination:    Constitutional: Patient is adequately groomed with no evidence of malnutrition       Physical Exam: right hip         Primary Examination       Inspection:  No deformity atrophy or effusion      Palpation:  No focal tenderness      Range of Motion:  Near full range and symmetric, mild pain with end range flexion and internal rotation      Strength:  Near normal with SLR      Special Tests:  Stinchfield test positive      Skin: There are no rashes, ulcerations or lesions. Gait:minimally antalgic     Neurovascular - non focal and intact      Additional Comments:       Additional Examinations:                Office Imaging Results/Procedures PerformedToday:          Office Procedures:   No orders of the defined types were placed in this encounter. Other Outside Imaging and Testing Personally Reviewed:       EXAMINATION:   THREE PHASE BONE SCAN       1/17/2018 2:26 pm       TECHNIQUE:   The patient was injected intravenously with 24.8 mCi of 99 mTc MDP.  Initial   blood flow and pool images of the pelvis and hips were acquired. After 3   hours, delayed bone images were acquired.       COMPARISON:   Pelvis and right hip radiograph, 11/14/2017.       HISTORY:   ORDERING PHYSICIAN PROVIDED HISTORY: Osteoporosis, unspecified osteoporosis   type, unspecified pathological fracture presence   TECHNOLOGIST PROVIDED HISTORY:   Technologist Provided Reason for Exam: Hip Pain   Acuity: Unknown   Type of Encounter: Ongoing       FINDINGS:   There is symmetric activity in the flow and blood pool images.       Delayed images show no abnormal foci of radiotracer deposition.  The right   hip specifically is unremarkable.           Impression   Negative three phase bone scan. Assessment   Impression: . Encounter Diagnosis   Name Primary?  Primary osteoarthritis of right hip Yes              Plan:        Activity modification-caution against overuse  Cane assisted weightbearing for symptom control  Proceed with type III PRP injection femoral acetabular joint at her

## 2018-02-01 NOTE — TELEPHONE ENCOUNTER
FAXED [address] [facility] 02/01/2018  FROM DR. IOANA BOWLING AND PT NOTE FROM 01/30/2018 TO GENA DISABILITY @ [address]. @ [phone]

## 2018-02-01 NOTE — LETTER
[place] [address]  Phone: [phone]  Fax: [phone]    Daisha Richardson MD        February 1, 2018     Patient: Katlyn Mahoney   YOB: 1960   Date of Visit: 2/1/2018       To Whom It May Concern: It is my medical opinion that Katlyn Mahoney may return to work on 3/1/18 with no restrictions . If you have any questions or concerns, please don't hesitate to call.     Sincerely,      Aletha Huynh MD.    Daisha Richardson MD

## 2018-02-01 NOTE — LETTER
[place] [address]  Phone: [phone]  Fax: [phone]    Pernell Mcallister MD        February 1, 2018     Patient: Mirta Allen   YOB: 1960   Date of Visit: 2/1/2018       To Whom It May Concern: It is my medical opinion that Mirta Allen may return to work on 3/2/18. If you have any questions or concerns, please don't hesitate to call.     Sincerely,    Valerio Witt MD.        Pernell Mcallister MD

## 2018-02-06 ENCOUNTER — HOSPITAL ENCOUNTER (OUTPATIENT)
Dept: PHYSICAL THERAPY | Age: 58
Discharge: HOME | End: 2018-02-07
Admitting: INTERNAL MEDICINE

## 2018-02-06 NOTE — FLOWSHEET NOTE
related to strengthening, flexibility, endurance, ROM for improvements in LE, proximal hip, and core control with self care, mobility, lifting, ambulation.  [] (60106) Provided verbal/tactile cueing for activities related to improving balance, coordination, kinesthetic sense, posture, motor skill, proprioception  to assist with LE, proximal hip, and core control in self care, mobility, lifting, ambulation and eccentric single leg control. NMR and Therapeutic Activities:    [] (74377 or 52915) Provided verbal/tactile cueing for activities related to improving balance, coordination, kinesthetic sense, posture, motor skill, proprioception and motor activation to allow for proper function of core, proximal hip and LE with self care and ADLs  [] (94067) Gait Re-education- Provided training and instruction to the patient for proper LE, core and proximal hip recruitment and positioning and eccentric body weight control with ambulation re-education including up and down stairs     Home Exercise Program:    [x] (35686) Reviewed/Progressed HEP activities related to strengthening, flexibility, endurance, ROM of core, proximal hip and LE for functional self-care, mobility, lifting and ambulation/stair navigation   [] (66356)Reviewed/Progressed HEP activities related to improving balance, coordination, kinesthetic sense, posture, motor skill, proprioception of core, proximal hip and LE for self care, mobility, lifting, and ambulation/stair navigation      Manual Treatments:  PROM / STM / Oscillations-Mobs:  G-I, II, III, IV (PA's, Inf., Post.)  [] (55004) Provided manual therapy to mobilize LE, proximal hip and/or LS spine soft tissue/joints for the purpose of modulating pain, promoting relaxation,  increasing ROM, reducing/eliminating soft tissue swelling/inflammation/restriction, improving soft tissue extensibility and allowing for proper ROM for normal function with self care, mobility, lifting and ambulation. Modalities: Declined ice 2/6    Charges:  Timed [facility] [place]   Total Treatment Minutes: 100-131       [] EVAL (LOW) 44374 (typically 20 minutes face-to-face)  [] EVAL (MOD) 98547 (typically 30 minutes face-to-face)  [] EVAL (HIGH) 62780 (typically 45 minutes face-to-face)  [] RE-EVAL     [x] HG(13897) x  1   [] IONTO  [x] NMR (64826) x  1   [] VASO  [] Manual (61483) x       [] Other:  [] TA x       [] Mech Traction (19449)  [] ES(attended) (57377)      [] ES (un) (42543):     GOALS:   Patient stated goal: Get rid of pain     Therapist goals for Patient:   Short Term Goals: To be achieved in: 2 weeks  1. Independent in HEP and progression per patient tolerance, in order to prevent re-injury. 2. Patient will have a decrease in pain to facilitate improvement in movement, function, and ADLs as indicated by Functional Deficits. Long Term Goals: To be achieved in: 6-8 weeks  1. Disability index score of 20% or less for the LEFS to assist with reaching prior level of function. 2. Patient will demonstrate increased AROM to WNL to allow for proper joint functioning as indicated by patients Functional Deficits. 3. Patient will demonstrate an increase in Strength to good proximal hip strength and control in LE (MMT at least 4+/5) to allow for proper functional mobility as indicated by patients Functional Deficits. 4. Patient will return to daily functional activities without increased symptoms or restriction. 5. Patient will be able to stand > or = to 20 minutes without increased symptoms or restriction      Progression Towards Functional goals:  [] Patient is progressing as expected towards functional goals listed. [] Progression is slowed due to complexities listed. [] Progression has been slowed due to co-morbidities. [x] Plan just implemented, too soon to assess goals progression  [] Other:     ASSESSMENT:  Pt did well today without increased pain or symptoms.   Gradually progress

## 2018-02-13 DIAGNOSIS — M16.11 PRIMARY OSTEOARTHRITIS OF RIGHT HIP: ICD-10-CM

## 2018-02-13 DIAGNOSIS — S76.019A HIP STRAIN, INITIAL ENCOUNTER: ICD-10-CM

## 2018-02-13 RX ORDER — TRAMADOL HYDROCHLORIDE 50 MG/1
50 TABLET ORAL EVERY 6 HOURS PRN
Qty: 30 TABLET | Refills: 0 | Status: SHIPPED | OUTPATIENT
Start: 2018-02-13 | End: 2018-05-31 | Stop reason: SDUPTHER

## 2018-02-22 ENCOUNTER — HOSPITAL ENCOUNTER (OUTPATIENT)
Dept: PHYSICAL THERAPY | Age: 58
Discharge: HOME | End: 2018-02-23
Admitting: INTERNAL MEDICINE

## 2018-02-22 NOTE — FLOWSHEET NOTE
[address], [address] [place] [address], [address] [place] [address]  Phone: [phone]   Fax:     [phone]    Physical Therapy Daily Treatment Note  Date:  2018    Patient Name:  Álvaro Yoon    :  1960  MRN: [id number]  Restrictions/Precautions:    Medical/Treatment Diagnosis Information:  · Diagnosis: M16.11 (ICD-10-CM) - Primary osteoarthritis of right hip  · Treatment Diagnosis: Right hip pain   Insurance/Certification information:  PT Insurance Information: [place]   Physician Information:  Referring Practitioner: Dr. Girard Cea of care signed (Y/N):     Date of Patient follow up with Physician: 3/1    G-Code (if applicable):      Date G-Code Applied: 18        Progress Note:    Next due by: Visit #10       Latex Allergy:  [x]NO      []YES  Preferred Language for Healthcare:   [x]English       []other:    Visit # Insurance Allowable   3   60     Pain level:  7/10 ()     SUBJECTIVE:  Pain staying there all the time -- sometimes even worse. Pt feels like the exercises make the pain worse.       OBJECTIVE: See eval  Observation:   Test measurements:      RESTRICTIONS/PRECAUTIONS:     Exercises/Interventions:     ROM/STRETCHES      ITB     SKTC  10x10\" each  Added    Lateral trunk rotation  20x  Added    PREs     PPT     Quad sets      SLR     Abduction - isometric 10x10\"     Adduction - isometric 10x10\"     Supine abduction with tband     Seated hip flexion with ER     clams     SL dead lift     Monster walks     Wall sit with tband                    Manual interventions Stretches (5x20\") - hamstring, piriformis, quad, rolling of ITB   long axis distraction  10' total           Therapeutic Exercise and NMR EXR  [x] (40120) Provided verbal/tactile cueing for activities related to strengthening, flexibility, endurance, ROM for improvements in LE, proximal hip, and core control with self

## 2018-02-27 ENCOUNTER — HOSPITAL ENCOUNTER (OUTPATIENT)
Dept: PHYSICAL THERAPY | Age: 58
Discharge: HOME | End: 2018-02-28
Admitting: INTERNAL MEDICINE

## 2018-02-27 NOTE — FLOWSHEET NOTE
[address], [address] [place] [address], [address] [place] [address]  Phone: [phone]   Fax:     [phone]    Physical Therapy Daily Treatment Note  Date:  2018    Patient Name:  Kait [place]    :  1960  MRN: [id number]  Restrictions/Precautions:    Medical/Treatment Diagnosis Information:  · Diagnosis: M16.11 (ICD-10-CM) - Primary osteoarthritis of right hip  · Treatment Diagnosis: Right hip pain   Insurance/Certification information:  PT Insurance Information: [place]   Physician Information:  Referring Practitioner: Dr. Tanika Call of care signed (Y/N):     Date of Patient follow up with Physician: 3/1    G-Code (if applicable):      Date G-Code Applied: 18        Progress Note:    Next due by: Visit #10       Latex Allergy:  [x]NO      []YES  Preferred Language for Healthcare:   [x]English       []other:    Visit # Insurance Allowable   4   60     Pain level:  6/10 ()     SUBJECTIVE:  Increased soreness after last visit, but not as much pain.       OBJECTIVE: See eval  Observation:   Test measurements:      RESTRICTIONS/PRECAUTIONS:     Exercises/Interventions:     ROM/STRETCHES      ITB     SKTC  10x10\" each  Added 2/22   Lateral trunk rotation  20x  Added 2/22   PREs     PPT     Quad sets      SLR     Abduction - isometric 10x10\"     Adduction - isometric 10x10\"     Supine abduction with tband     Seated hip flexion with ER     clams     SL dead lift     Monster walks     Wall sit with tband     bridges 3x10  Added 2/6   Hamstring curls  3x10  Added 2/6   Hip extensions      Tandem balance  3x30\" each way  Added 2/6                  Manual interventions Stretches (5x20\") - hamstring, rolling of ITB & hamstring  Circumduction CW/CCW, long axis distraction  10' total           Therapeutic Exercise and NMR EXR  [x] (43069) Provided verbal/tactile cueing for activities related to strengthening, flexibility, endurance, ROM for improvements in LE, proximal hip, and core control with self care, mobility, lifting, ambulation.  [] (56027) Provided verbal/tactile cueing for activities related to improving balance, coordination, kinesthetic sense, posture, motor skill, proprioception  to assist with LE, proximal hip, and core control in self care, mobility, lifting, ambulation and eccentric single leg control. NMR and Therapeutic Activities:    [] (58859 or 20590) Provided verbal/tactile cueing for activities related to improving balance, coordination, kinesthetic sense, posture, motor skill, proprioception and motor activation to allow for proper function of core, proximal hip and LE with self care and ADLs  [] (81103) Gait Re-education- Provided training and instruction to the patient for proper LE, core and proximal hip recruitment and positioning and eccentric body weight control with ambulation re-education including up and down stairs     Home Exercise Program:    [x] (64377) Reviewed/Progressed HEP activities related to strengthening, flexibility, endurance, ROM of core, proximal hip and LE for functional self-care, mobility, lifting and ambulation/stair navigation   [] (13947)Reviewed/Progressed HEP activities related to improving balance, coordination, kinesthetic sense, posture, motor skill, proprioception of core, proximal hip and LE for self care, mobility, lifting, and ambulation/stair navigation      Manual Treatments:  PROM / STM / Oscillations-Mobs:  G-I, II, III, IV (PA's, Inf., Post.)  [x] (14505) Provided manual therapy to mobilize LE, proximal hip and/or LS spine soft tissue/joints for the purpose of modulating pain, promoting relaxation,  increasing ROM, reducing/eliminating soft tissue swelling/inflammation/restriction, improving soft tissue extensibility and allowing for proper ROM for normal function with self care, mobility, lifting and ambulation.      Modalities: Ice 15' due to reports of pain in her hip and low back. 2/27      Treatment/Activity Tolerance:  [x] Patient tolerated treatment fair+ [] Patient limited by fatique  [x] Patient limited by pain  [] Patient limited by other medical complications  [] Other:    Patient education:   1/30: Patient education on PT and plan of care including diagnosis, prognosis, treatment goals and options. Patient agrees with discussed POC and treatment and is aware of rehab process. Pt was also educated on clinic layout and use of modalities. Prognosis: [x] Good [] Fair  [] Poor    Patient Requires Follow-up: [x] Yes  [] No    PLAN: 2x per week for 4 weeks.    [x] Continue per plan of care [] Alter current plan (see comments)  [] Plan of care initiated [] Hold pending MD visit [] Discharge    Electronically signed by: Stiven Cruz PT, DPT

## 2018-03-01 ENCOUNTER — HOSPITAL ENCOUNTER (OUTPATIENT)
Dept: PHYSICAL THERAPY | Age: 58
Discharge: HOME | End: 2018-03-31
Attending: INTERNAL MEDICINE | Admitting: INTERNAL MEDICINE

## 2018-03-01 ENCOUNTER — OFFICE VISIT (OUTPATIENT)
Dept: ORTHOPEDIC SURGERY | Age: 58
End: 2018-03-01

## 2018-03-01 ENCOUNTER — HOSPITAL ENCOUNTER (OUTPATIENT)
Dept: PHYSICAL THERAPY | Age: 58
Discharge: HOME | End: 2018-03-02
Admitting: INTERNAL MEDICINE

## 2018-03-01 DIAGNOSIS — M16.11 PRIMARY OSTEOARTHRITIS OF RIGHT HIP: Primary | ICD-10-CM

## 2018-03-01 PROCEDURE — 99213 OFFICE O/P EST LOW 20 MIN: CPT | Performed by: INTERNAL MEDICINE

## 2018-03-01 RX ORDER — CELECOXIB 200 MG/1
200 CAPSULE ORAL DAILY
Qty: 30 CAPSULE | Refills: 1 | Status: SHIPPED | OUTPATIENT
Start: 2018-03-01 | End: 2018-05-31 | Stop reason: SDUPTHER

## 2018-03-01 NOTE — FLOWSHEET NOTE
total           Therapeutic Exercise and NMR EXR  [x] (38482) Provided verbal/tactile cueing for activities related to strengthening, flexibility, endurance, ROM for improvements in LE, proximal hip, and core control with self care, mobility, lifting, ambulation.  [] (32776) Provided verbal/tactile cueing for activities related to improving balance, coordination, kinesthetic sense, posture, motor skill, proprioception  to assist with LE, proximal hip, and core control in self care, mobility, lifting, ambulation and eccentric single leg control.      NMR and Therapeutic Activities:    [] (35715 or 09432) Provided verbal/tactile cueing for activities related to improving balance, coordination, kinesthetic sense, posture, motor skill, proprioception and motor activation to allow for proper function of core, proximal hip and LE with self care and ADLs  [] (87738) Gait Re-education- Provided training and instruction to the patient for proper LE, core and proximal hip recruitment and positioning and eccentric body weight control with ambulation re-education including up and down stairs     Home Exercise Program:    [x] (23384) Reviewed/Progressed HEP activities related to strengthening, flexibility, endurance, ROM of core, proximal hip and LE for functional self-care, mobility, lifting and ambulation/stair navigation   [] (20614)Reviewed/Progressed HEP activities related to improving balance, coordination, kinesthetic sense, posture, motor skill, proprioception of core, proximal hip and LE for self care, mobility, lifting, and ambulation/stair navigation      Manual Treatments:  PROM / STM / Oscillations-Mobs:  G-I, II, III, IV (PA's, Inf., Post.)  [x] (16724) Provided manual therapy to mobilize LE, proximal hip and/or LS spine soft tissue/joints for the purpose of modulating pain, promoting relaxation,  increasing ROM, reducing/eliminating soft tissue swelling/inflammation/restriction, improving soft tissue extensibility

## 2018-03-02 ENCOUNTER — TELEPHONE (OUTPATIENT)
Dept: ORTHOPEDIC SURGERY | Age: 58
End: 2018-03-02

## 2018-03-05 ENCOUNTER — TELEPHONE (OUTPATIENT)
Dept: ORTHOPEDIC SURGERY | Age: 58
End: 2018-03-05

## 2018-03-06 ENCOUNTER — TELEPHONE (OUTPATIENT)
Dept: ORTHOPEDIC SURGERY | Age: 58
End: 2018-03-06

## 2018-03-20 ENCOUNTER — HOSPITAL ENCOUNTER (OUTPATIENT)
Dept: PHYSICAL THERAPY | Age: 58
Discharge: HOME | End: 2018-03-21
Admitting: INTERNAL MEDICINE

## 2018-03-20 NOTE — FLOWSHEET NOTE
[address], [address] [place] [address], [address] [place] [address]  Phone: [phone]   Fax:     [phone]    Physical Therapy Daily Treatment Note  Date:  3/20/2018    Patient Name:  Rober Koyanagi    :  1960  MRN: [id number]  Restrictions/Precautions:    Medical/Treatment Diagnosis Information:  · Diagnosis: M16.11 (ICD-10-CM) - Primary osteoarthritis of right hip  · Treatment Diagnosis: Right hip pain   Insurance/Certification information:  PT Insurance Information: [place]   Physician Information:  Referring Practitioner: Dr. Sumanth Sherwood of care signed (Y/N):     Date of Patient follow up with Physician:      G-Code (if applicable):      Date G-Code Applied: 18        Progress Note:    Next due by: Visit #10       Latex Allergy:  [x]NO      []YES  Preferred Language for Healthcare:   [x]English       []other:    Visit # Insurance Allowable   6  3/20 60     Pain level:  4-8/10 (3/20)     SUBJECTIVE: Pt reports she has been sick with a virus - still tried to do her exercises. She gets flare ups of pain.   3/20    OBJECTIVE: See eval  Observation:   Test measurements:      RESTRICTIONS/PRECAUTIONS:     Exercises/Interventions:     ROM/STRETCHES Reintroduce stretching as able for HEP     ITB     SKTC  10x10\" each  Added 2/22   Lateral trunk rotation  20x  Added 2/22   PREs     PPT     Quad sets      SLR NPV     Abduction - isometric 10x10\"     Adduction - isometric 10x10\"     Supine abduction with tband     Seated hip flexion with ER     Clams/fishtails 2x10 each  Added 3/20   SL dead lift     Monster walks     Wall sit with tband     bridges 3x10  Added 2/6   Hamstring curls  3x10  Added 2/6   Hip extensions      Tandem balance  3x30\" each way  Added 2/6   Rocker board 3x30\" (pf/df)  Added 3/1             Manual interventions Stretches (5x20\") - hamstring, piriformis, quad, rolling of ITB &

## 2018-03-27 ENCOUNTER — HOSPITAL ENCOUNTER (OUTPATIENT)
Dept: PHYSICAL THERAPY | Age: 58
Discharge: HOME | End: 2018-03-28
Admitting: INTERNAL MEDICINE

## 2018-03-27 NOTE — FLOWSHEET NOTE
Therapeutic Exercise and NMR EXR  [x] (09427) Provided verbal/tactile cueing for activities related to strengthening, flexibility, endurance, ROM for improvements in LE, proximal hip, and core control with self care, mobility, lifting, ambulation. [x] (24002) Provided verbal/tactile cueing for activities related to improving balance, coordination, kinesthetic sense, posture, motor skill, proprioception  to assist with LE, proximal hip, and core control in self care, mobility, lifting, ambulation and eccentric single leg control.      NMR and Therapeutic Activities:    [x] (31993 or 72830) Provided verbal/tactile cueing for activities related to improving balance, coordination, kinesthetic sense, posture, motor skill, proprioception and motor activation to allow for proper function of core, proximal hip and LE with self care and ADLs  [] (98680) Gait Re-education- Provided training and instruction to the patient for proper LE, core and proximal hip recruitment and positioning and eccentric body weight control with ambulation re-education including up and down stairs     Home Exercise Program:    [x] (74191) Reviewed/Progressed HEP activities related to strengthening, flexibility, endurance, ROM of core, proximal hip and LE for functional self-care, mobility, lifting and ambulation/stair navigation   [] (67093)Reviewed/Progressed HEP activities related to improving balance, coordination, kinesthetic sense, posture, motor skill, proprioception of core, proximal hip and LE for self care, mobility, lifting, and ambulation/stair navigation      Manual Treatments:  PROM / STM / Oscillations-Mobs:  G-I, II, III, IV (PA's, Inf., Post.)  [x] (47900) Provided manual therapy to mobilize LE, proximal hip and/or LS spine soft tissue/joints for the purpose of modulating pain, promoting relaxation,  increasing ROM, reducing/eliminating soft tissue swelling/inflammation/restriction, improving soft tissue extensibility and allowing for proper ROM for normal function with self care, mobility, lifting and ambulation. Modalities: Declined ice     Charges:  Timed Code Treatment Minutes: 4[address]    Total Treatment Minutes: 1376-6518       [] EVAL (LOW) 86962 (typically 20 minutes face-to-face)  [] EVAL (MOD) 19007 (typically 30 minutes face-to-face)  [] EVAL (HIGH) 40661 (typically 45 minutes face-to-face)  [] RE-EVAL     [x] FD(44460) x  1   [] IONTO  [x] NMR (02511) x  1   [] VASO  [] Manual (50677) x       [] Other:  [] TA x       [] Mech Traction (20941)  [] ES(attended) (05203)      [] ES (un) (68166):     GOALS:   Patient stated goal: Get rid of pain     Therapist goals for Patient:   Short Term Goals: To be achieved in: 2 weeks  1. Independent in HEP and progression per patient tolerance, in order to prevent re-injury. 2. Patient will have a decrease in pain to facilitate improvement in movement, function, and ADLs as indicated by Functional Deficits. Long Term Goals: To be achieved in: 6-8 weeks  1. Disability index score of 20% or less for the LEFS to assist with reaching prior level of function. 2. Patient will demonstrate increased AROM to WNL to allow for proper joint functioning as indicated by patients Functional Deficits. 3. Patient will demonstrate an increase in Strength to good proximal hip strength and control in LE (MMT at least 4+/5) to allow for proper functional mobility as indicated by patients Functional Deficits. 4. Patient will return to daily functional activities without increased symptoms or restriction. 5. Patient will be able to stand > or = to 20 minutes without increased symptoms or restriction      Progression Towards Functional goals:  [] Patient is progressing as expected towards functional goals listed. [] Progression is slowed due to complexities listed. [] Progression has been slowed due to co-morbidities.   [x] Plan just implemented, too soon to assess goals

## 2018-04-01 ENCOUNTER — HOSPITAL ENCOUNTER (OUTPATIENT)
Dept: PHYSICAL THERAPY | Age: 58
Discharge: HOME | End: 2018-04-30
Attending: INTERNAL MEDICINE | Admitting: INTERNAL MEDICINE

## 2018-04-05 ENCOUNTER — OFFICE VISIT (OUTPATIENT)
Dept: ORTHOPEDIC SURGERY | Age: 58
End: 2018-04-05

## 2018-04-05 VITALS — BODY MASS INDEX: 33.8 KG/M2 | WEIGHT: 198 LBS | HEIGHT: 64 IN

## 2018-04-05 DIAGNOSIS — S76.011D HIP STRAIN, RIGHT, SUBSEQUENT ENCOUNTER: ICD-10-CM

## 2018-04-05 DIAGNOSIS — M16.11 PRIMARY OSTEOARTHRITIS OF RIGHT HIP: Primary | ICD-10-CM

## 2018-04-05 PROCEDURE — 99213 OFFICE O/P EST LOW 20 MIN: CPT | Performed by: INTERNAL MEDICINE

## 2018-04-10 ENCOUNTER — TELEPHONE (OUTPATIENT)
Dept: ORTHOPEDIC SURGERY | Age: 58
End: 2018-04-10

## 2018-05-03 ENCOUNTER — OFFICE VISIT (OUTPATIENT)
Dept: ORTHOPEDIC SURGERY | Age: 58
End: 2018-05-03

## 2018-05-03 DIAGNOSIS — M16.11 PRIMARY OSTEOARTHRITIS OF RIGHT HIP: Primary | ICD-10-CM

## 2018-05-03 PROCEDURE — 99999 PR OFFICE/OUTPT VISIT,PROCEDURE ONLY: CPT | Performed by: INTERNAL MEDICINE

## 2018-05-03 PROCEDURE — 0232T NJX PLATELET PLASMA: CPT | Performed by: INTERNAL MEDICINE

## 2018-05-10 ENCOUNTER — TELEPHONE (OUTPATIENT)
Dept: ORTHOPEDIC SURGERY | Age: 58
End: 2018-05-10

## 2018-05-31 ENCOUNTER — TELEPHONE (OUTPATIENT)
Dept: ORTHOPEDIC SURGERY | Age: 58
End: 2018-05-31

## 2018-05-31 ENCOUNTER — OFFICE VISIT (OUTPATIENT)
Dept: ORTHOPEDIC SURGERY | Age: 58
End: 2018-05-31

## 2018-05-31 VITALS — WEIGHT: 198 LBS | BODY MASS INDEX: 33.8 KG/M2 | HEIGHT: 64 IN

## 2018-05-31 DIAGNOSIS — S76.019A HIP STRAIN, INITIAL ENCOUNTER: ICD-10-CM

## 2018-05-31 DIAGNOSIS — M16.11 PRIMARY OSTEOARTHRITIS OF RIGHT HIP: Primary | ICD-10-CM

## 2018-05-31 PROCEDURE — 99213 OFFICE O/P EST LOW 20 MIN: CPT | Performed by: INTERNAL MEDICINE

## 2018-05-31 RX ORDER — TRAMADOL HYDROCHLORIDE 50 MG/1
50 TABLET ORAL EVERY 6 HOURS PRN
Qty: 30 TABLET | Refills: 0 | Status: SHIPPED | OUTPATIENT
Start: 2018-05-31 | End: 2018-06-10

## 2018-05-31 RX ORDER — CELECOXIB 200 MG/1
200 CAPSULE ORAL DAILY PRN
Qty: 30 CAPSULE | Refills: 1 | Status: SHIPPED | OUTPATIENT
Start: 2018-05-31 | End: 2020-11-02

## 2018-07-12 ENCOUNTER — OFFICE VISIT (OUTPATIENT)
Dept: ORTHOPEDIC SURGERY | Age: 58
End: 2018-07-12

## 2018-07-12 VITALS — HEIGHT: 64 IN | BODY MASS INDEX: 33.8 KG/M2 | WEIGHT: 198 LBS

## 2018-07-12 DIAGNOSIS — M16.11 PRIMARY OSTEOARTHRITIS OF RIGHT HIP: Primary | ICD-10-CM

## 2018-07-12 PROCEDURE — 99214 OFFICE O/P EST MOD 30 MIN: CPT | Performed by: INTERNAL MEDICINE

## 2018-07-12 NOTE — PROGRESS NOTES
for her at this moment in time  We will discuss her case with adult reconstruction-Dr. Dinora Mendiola to reconsider surgical intervention as an option for treatment  Continue medical pain management as per previous when necessary use of Celebrex when necessary use of tramadol although these do not provide her predictable pain relief unfortunately     Orders:        Orders Placed This Encounter   Procedures   [facility]     Referral Priority:   Routine     Referral Type:   Eval and Treat     Referral Reason:   Specialty Services Required     Requested Specialty:   Physical Therapy     Number of Visits Requested:   1         Seamus Call MD.      Tamela Neelyman: \"This note was dictated with voice recognition software. Though review and correction are routine, we apologize for any errors. \"

## 2018-07-13 ENCOUNTER — TELEPHONE (OUTPATIENT)
Dept: ORTHOPEDIC SURGERY | Age: 58
End: 2018-07-13

## 2018-07-24 ENCOUNTER — OFFICE VISIT (OUTPATIENT)
Dept: ORTHOPEDIC SURGERY | Age: 58
End: 2018-07-24

## 2018-07-24 VITALS
HEIGHT: 64 IN | DIASTOLIC BLOOD PRESSURE: 88 MMHG | HEART RATE: 91 BPM | SYSTOLIC BLOOD PRESSURE: 139 MMHG | WEIGHT: 204 LBS | BODY MASS INDEX: 34.83 KG/M2

## 2018-07-24 DIAGNOSIS — M16.11 PRIMARY OSTEOARTHRITIS OF RIGHT HIP: Primary | ICD-10-CM

## 2018-07-24 PROCEDURE — 99213 OFFICE O/P EST LOW 20 MIN: CPT | Performed by: ORTHOPAEDIC SURGERY

## 2018-07-24 RX ORDER — TRAMADOL HYDROCHLORIDE 50 MG/1
50 TABLET ORAL EVERY 6 HOURS PRN
COMMUNITY
End: 2020-10-02
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
## 2018-08-09 ENCOUNTER — OFFICE VISIT (OUTPATIENT)
Dept: ORTHOPEDIC SURGERY | Age: 58
End: 2018-08-09

## 2018-08-09 VITALS — WEIGHT: 204 LBS | BODY MASS INDEX: 34.83 KG/M2 | HEIGHT: 64 IN

## 2018-08-09 DIAGNOSIS — M16.11 PRIMARY OSTEOARTHRITIS OF RIGHT HIP: Primary | ICD-10-CM

## 2018-08-09 PROCEDURE — 99213 OFFICE O/P EST LOW 20 MIN: CPT | Performed by: INTERNAL MEDICINE

## 2018-08-09 NOTE — PROGRESS NOTES
Chief Complaint:   Chief Complaint   Patient presents with    Hip Pain     f/u R hip pain          History of Present Illness:       Patient is a 59 y.o. female returns follow up for the above complaint. The patient was last seen approximately 1 monthsago. The symptoms show no change since the last visit. The patient has had no further testing for the problem. In the interim she has had a follow-up visit with Dr. Mateus Up. Pain levels 5/10    She continues to experience anterior thigh and right groin pain that is worsened with weightbearing and prolonged sitting activity. She denies any new onset weakness or progressive weakness of the lower extremity she denies any neuritic symptoms involving the lower limbs    She is using Celebrex only intermittently with incomplete relief Ultram sparingly and intermittent use of cane assistance. She remains on long-term disability unable to return to her previous level implanted relative to the physical nature of the position    She attempted to proceed with a functional capacity evaluation but this was too costly for her at this moment in time    She'll like to proceed with more definitive treatment of her right hip osteoarthritis       Past Medical History:        Past Medical History:   Diagnosis Date    Hyperlipidemia     Thyroid disease         Present Medications:         Current Outpatient Prescriptions   Medication Sig Dispense Refill    traMADol (ULTRAM) 50 MG tablet Take 50 mg by mouth every 6 hours as needed for Pain. Lorren Lesches celecoxib (CELEBREX) 200 MG capsule Take 1 capsule by mouth daily as needed for Pain 30 capsule 1    levothyroxine (SYNTHROID) 100 MCG tablet TAKE 1 TABLET (100 MCG TOTAL) BY MOUTH DAILY. 0    omeprazole (PRILOSEC) 20 MG delayed release capsule TAKE 1 CAPSULE (20 MG TOTAL) BY MOUTH DAILY. 3     No current facility-administered medications for this visit. Allergies:         Allergies   Allergen Reactions    Phenergan [Promethazine Hcl]      Out of my head \"loopY\"    Sulfa Antibiotics Rash           Review of Systems:    Pertinent items are noted in HPI         Vital Signs: There were no vitals filed for this visit. General Exam:     Constitutional: Patient is adequately groomed with no evidence of malnutrition    Physical Exam: right hip      Primary Exam:    Inspection:  No deformity atrophy or appreciable effusion      Palpation:  No focal tenderness      Range of Motion:  Mild asymmetric decrease with pain in extremes      Strength:  Normal with SLR      Special Tests:  Stinchfield test positive logroll test positive for pain      Skin: There are no rashes, ulcerations or lesions. Gait: Minimally antalgic startup hesitation     Neurovascular - non focal and intact       Additional Comments:        Additional Examinations:                   Office Imaging Results/Procedures PerformedToday:           Office Procedures:   No orders of the defined types were placed in this encounter. Other Outside Imaging and Testing Personally Reviewed:               Assessment   Impression: . Encounter Diagnosis   Name Primary?  Primary osteoarthritis of right hip Yes              Plan:       Continue with long-term disability as she cannot return to the previous job description relative to her right hip  Follow-up recommendations from Dr. Beverley Mccord and consider Marcaine injection right hip as needed for diagnostic measures  Medical pain management as per previous multimodal minimize use of narcotics which she is really doing  When necessary use of cane assistance           Orders:      No orders of the defined types were placed in this encounter. Willis Coe MD.      Disclaimer: \"This note was dictated with voice recognition software. Though review and correction are routine, we apologize for any errors. \"

## 2018-08-13 ENCOUNTER — TELEPHONE (OUTPATIENT)
Dept: ORTHOPEDIC SURGERY | Age: 58
End: 2018-08-13

## 2018-09-18 ENCOUNTER — TELEPHONE (OUTPATIENT)
Dept: ORTHOPEDIC SURGERY | Age: 58
End: 2018-09-18

## 2018-09-18 NOTE — TELEPHONE ENCOUNTER
Disability from [employer]. Is patient off work? What date can she return? Any restrictions? Please advise.

## 2018-10-04 ENCOUNTER — OFFICE VISIT (OUTPATIENT)
Dept: ORTHOPEDIC SURGERY | Age: 58
End: 2018-10-04

## 2018-10-04 DIAGNOSIS — S76.011D HIP STRAIN, RIGHT, SUBSEQUENT ENCOUNTER: ICD-10-CM

## 2018-10-04 DIAGNOSIS — M16.11 PRIMARY OSTEOARTHRITIS OF RIGHT HIP: Primary | ICD-10-CM

## 2018-10-04 PROCEDURE — 99213 OFFICE O/P EST LOW 20 MIN: CPT | Performed by: INTERNAL MEDICINE

## 2018-10-04 NOTE — PROGRESS NOTES
Chief Complaint:   Chief Complaint   Patient presents with    Hip Pain     f/u R hip pain          History of Present Illness:       Patient is a 59 y.o. female returns follow up for the above complaint. The patient was last seen approximately 2 monthsago. The symptoms show no change since the last visit. The patient has had further testing for the problem. Unfortunately her symptoms remain problematic she is not seen by Dr. Rox Toro in the interim for reconsideration of total hip arthroplasty as a treatment option. WOMAC: 87 Which remains elevated Consistent with previous evaluations    She continues localized pain about the anterior medial groin and medial thigh with radiation into the knee at times without radiation below the knee pain is aching in quality she denies any neuritic character symptoms    Symptoms are worse with prolonged sitting and with rising from seated position and increase activity levels no mechanical symptoms she denies any once of progressive weakness of the lower extremity         Past Medical History:        Past Medical History:   Diagnosis Date    Hyperlipidemia     Thyroid disease         Present Medications:         Current Outpatient Prescriptions   Medication Sig Dispense Refill    traMADol (ULTRAM) 50 MG tablet Take 50 mg by mouth every 6 hours as needed for Pain. Ashlie Weinert celecoxib (CELEBREX) 200 MG capsule Take 1 capsule by mouth daily as needed for Pain 30 capsule 1    levothyroxine (SYNTHROID) 100 MCG tablet TAKE 1 TABLET (100 MCG TOTAL) BY MOUTH DAILY. 0    omeprazole (PRILOSEC) 20 MG delayed release capsule TAKE 1 CAPSULE (20 MG TOTAL) BY MOUTH DAILY. 3     No current facility-administered medications for this visit. Allergies: Allergies   Allergen Reactions    Phenergan [Promethazine Hcl]      Out of my head \"loopY\"    Sulfa Antibiotics Rash           Review of Systems:    Pertinent items are noted in HPI      Vital Signs:     There were no

## 2018-10-08 ENCOUNTER — TELEPHONE (OUTPATIENT)
Dept: ORTHOPEDIC SURGERY | Age: 58
End: 2018-10-08

## 2018-10-09 ENCOUNTER — TELEPHONE (OUTPATIENT)
Dept: ORTHOPEDIC SURGERY | Age: 58
End: 2018-10-09

## 2018-10-22 ENCOUNTER — TELEPHONE (OUTPATIENT)
Dept: ORTHOPEDIC SURGERY | Age: 58
End: 2018-10-22

## 2019-07-29 ENCOUNTER — TELEPHONE (OUTPATIENT)
Dept: ORTHOPEDIC SURGERY | Age: 59
End: 2019-07-29

## 2019-07-30 ENCOUNTER — TELEPHONE (OUTPATIENT)
Dept: ORTHOPEDIC SURGERY | Age: 59
End: 2019-07-30

## 2020-04-16 ENCOUNTER — TELEPHONE (OUTPATIENT)
Dept: ORTHOPEDIC SURGERY | Age: 60
End: 2020-04-16

## 2020-04-16 RX ORDER — IBUPROFEN 800 MG/1
800 TABLET ORAL EVERY 8 HOURS PRN
Qty: 90 TABLET | Refills: 1 | Status: SHIPPED | OUTPATIENT
Start: 2020-04-16 | End: 2021-05-03

## 2020-04-16 NOTE — TELEPHONE ENCOUNTER
Ibuprofen is fine to take as long as she does not have any COVID symptoms of fever, cough or shortness of breath.   Prescription sent

## 2020-05-29 ENCOUNTER — OFFICE VISIT (OUTPATIENT)
Dept: ORTHOPEDIC SURGERY | Age: 60
End: 2020-05-29
Payer: MEDICARE

## 2020-05-29 VITALS — WEIGHT: 212 LBS | HEIGHT: 63 IN | TEMPERATURE: 98.2 F | BODY MASS INDEX: 37.56 KG/M2

## 2020-05-29 PROBLEM — M54.16 PAIN, RADICULAR, LUMBAR: Status: ACTIVE | Noted: 2020-05-29

## 2020-05-29 PROBLEM — M16.0 BILATERAL PRIMARY OSTEOARTHRITIS OF HIP: Status: ACTIVE | Noted: 2020-05-29

## 2020-05-29 PROCEDURE — G8427 DOCREV CUR MEDS BY ELIG CLIN: HCPCS | Performed by: ORTHOPAEDIC SURGERY

## 2020-05-29 PROCEDURE — 99213 OFFICE O/P EST LOW 20 MIN: CPT | Performed by: ORTHOPAEDIC SURGERY

## 2020-05-29 PROCEDURE — G8419 CALC BMI OUT NRM PARAM NOF/U: HCPCS | Performed by: ORTHOPAEDIC SURGERY

## 2020-05-29 PROCEDURE — 3017F COLORECTAL CA SCREEN DOC REV: CPT | Performed by: ORTHOPAEDIC SURGERY

## 2020-05-29 PROCEDURE — 1036F TOBACCO NON-USER: CPT | Performed by: ORTHOPAEDIC SURGERY

## 2020-05-29 RX ORDER — ATORVASTATIN CALCIUM 40 MG/1
40 TABLET, FILM COATED ORAL DAILY
COMMUNITY
Start: 2019-07-26

## 2020-06-04 ENCOUNTER — TELEPHONE (OUTPATIENT)
Dept: ORTHOPEDIC SURGERY | Age: 60
End: 2020-06-04

## 2020-09-11 ENCOUNTER — OFFICE VISIT (OUTPATIENT)
Dept: ORTHOPEDIC SURGERY | Age: 60
End: 2020-09-11
Payer: MEDICARE

## 2020-09-11 VITALS — WEIGHT: 212 LBS | BODY MASS INDEX: 37.56 KG/M2 | HEIGHT: 63 IN

## 2020-09-11 PROCEDURE — 99213 OFFICE O/P EST LOW 20 MIN: CPT | Performed by: PHYSICIAN ASSISTANT

## 2020-09-11 PROCEDURE — 20610 DRAIN/INJ JOINT/BURSA W/O US: CPT | Performed by: PHYSICIAN ASSISTANT

## 2020-09-11 RX ORDER — BETAMETHASONE SODIUM PHOSPHATE AND BETAMETHASONE ACETATE 3; 3 MG/ML; MG/ML
12 INJECTION, SUSPENSION INTRA-ARTICULAR; INTRALESIONAL; INTRAMUSCULAR; SOFT TISSUE ONCE
Status: COMPLETED | OUTPATIENT
Start: 2020-09-11 | End: 2020-09-11

## 2020-09-11 RX ORDER — LIDOCAINE HYDROCHLORIDE 10 MG/ML
5 INJECTION, SOLUTION INFILTRATION; PERINEURAL ONCE
Status: COMPLETED | OUTPATIENT
Start: 2020-09-11 | End: 2020-09-11

## 2020-09-11 RX ADMIN — BETAMETHASONE SODIUM PHOSPHATE AND BETAMETHASONE ACETATE 12 MG: 3; 3 INJECTION, SUSPENSION INTRA-ARTICULAR; INTRALESIONAL; INTRAMUSCULAR; SOFT TISSUE at 12:02

## 2020-09-11 RX ADMIN — LIDOCAINE HYDROCHLORIDE 5 ML: 10 INJECTION, SOLUTION INFILTRATION; PERINEURAL at 12:03

## 2020-09-11 NOTE — PROGRESS NOTES
Patient Wilbur Medina  Medical Record Number: [id number]  YOB: 1960  Date of Encounter: 2020     Chief Complaint   Patient presents with    Knee Pain     New problem, LT knee pain. NKI, pain for 6 weeks        History of Present Illness:  Nel Anthony is a 56 y.o. female here for evaluation of her left knee. Her pain assessment is documented below and I reviewed this with her today. Patient states she began having left knee pain 6 weeks ago without any known injury. She has been having right hip pain for quite some time now and feels her left knee pain is caused by the way she is having to walk to compensate for the hip pain. She denies swelling, redness or warmth of the left knee. She denies locking or catching. Pain is worse with walking or standing. Pain does occasionally wake her up at night. Most of her pain is along the medial aspect of her knee but occasionally has posterior knee pain.     Pain Assessment  Location of Pain: Knee  Location Modifiers: Left  Severity of Pain: 10  Quality of Pain: Aching, Dull  Duration of Pain: Persistent  Frequency of Pain: Constant  Aggravating Factors: Walking, Standing  Limiting Behavior: Some  Relieving Factors: Rest  Result of Injury: No  Work-Related Injury: No  Are there other pain locations you wish to document?: No    Past Medical History:   Diagnosis Date    Hyperlipidemia     Thyroid disease        Past Surgical History:   Procedure Laterality Date     SECTION      x2    CHOLECYSTECTOMY      CYSTOSCOPY  13    CYSTOSCOPY URETHRAL DILATATION HYDRODISTENTION OF BLADDER    KIDNEY REMOVAL Left     TUBAL LIGATION         Current Outpatient Medications   Medication Sig Dispense Refill    atorvastatin (LIPITOR) 40 MG tablet Take 40 mg by mouth daily      ibuprofen (ADVIL;MOTRIN) 800 MG tablet Take 1 tablet by mouth every 8 hours as needed for Pain 90 tablet 1    traMADol (ULTRAM) 50 MG tablet Take 50 mg by mouth every 6 hours as needed for Pain. Bj Benson celecoxib (CELEBREX) 200 MG capsule Take 1 capsule by mouth daily as needed for Pain 30 capsule 1    levothyroxine (SYNTHROID) 100 MCG tablet TAKE 1 TABLET (100 MCG TOTAL) BY MOUTH DAILY. 0    omeprazole (PRILOSEC) 20 MG delayed release capsule TAKE 1 CAPSULE (20 MG TOTAL) BY MOUTH DAILY. 3     Current Facility-Administered Medications   Medication Dose Route Frequency Provider Last Rate Last Dose    betamethasone acetate-betamethasone sodium phosphate (CELESTONE) injection 12 mg  12 mg Intra-articular Once FERNANDO Sigala-HARRIS        lidocaine 1 % injection 5 mL  5 mL Intra-articular Once Nella Luna PA-C         Allergies, social and family histories were reviewed and updated as appropriate. Review of Systems:  Relevant review of systems reviewed and available in the patient's chart under the 'MEDIA' tab. Vital Signs:  Ht 5' 3\" (1.6 m)   Wt 212 lb (96.2 kg)   BMI 37.55 kg/m²     General Exam:   Constitutional: Patient is adequately groomed with no evidence of malnutrition  Mental Status: The patient is oriented to time, place and person. The patient's mood and affect are appropriate. Lymphatic: The lymphatic examination bilaterally reveals all areas to be without enlargement or induration. Neurological: The patient has good coordination and balance. There is no focal weakness or sensory deficit. Left Knee Examination:    Inspection: Normal muscle contours and no significant limb length discrepancy. No gross atrophy in any particular myotome. There is no obvious swelling or joint effusion of the knee. There are no abrasions, lacerations, contusions, hematomas or ecchymosis. There is no erythema, induration or warmth to suggest an infectious process. Palpation: Patient has mild tenderness on palpation of the medial joint line. She has moderate patellofemoral crepitus.     Range of Motion:    Flexion: 120°   Extension: 0°    Strength:  Quad 4+ / 5 ; Hamstrings 4+ / 5. Gross motor to hip and ankle intact    Special Tests:    Lachman (ACL) - negative   Posterior Lachman (PCL) - negative    Anterior Drawer (ACL) - negative   Posterior Drawer (PCL) - negative   Pivot shift (ACL) - negative    Posterior sag (PCL) - negative   Taylor's Test (Meniscus) - negative   Homans Test (Thrombophlebitis)- negative   Does not open to valgus or varus stress at 0 or 30° (MCL/LCL)    Skin: There are no rashes, ulcerations or lesions. Sensation to light touch intact. Circulation: The limb is warm and well perfused. Distal pulses intact. Capillary refill is intact. Edema: none. Venous stasis changes: none. Gait: Patient has a antalgic gait and is taking short strides. Radiology:  X-rays obtained today and reviewed in office:  Views: 4 view left knee with comparison AP, flexion PA and skyline views of the right knee  Impression: No evidence for acute fracture or subluxation / dislocation. There are no loose bodies appreciated. There is no evidence of tibiofemoral subluxation. There are mild tibiofemoral arthritic changes. There are moderate to severe patellofemoral arthritic changes worse along the lateral aspect. Impression:  Encounter Diagnoses   Name Primary?     Tear of medial meniscus of left knee, current, unspecified tear type, initial encounter Yes    Primary osteoarthritis of left knee     Acute pain of left knee        Office Procedures:  Orders Placed This Encounter   Procedures    XR KNEE BILATERAL STANDING     Standing Status:   Future     Number of Occurrences:   1     Standing Expiration Date:   10/11/2020    XR KNEE LEFT (3 VIEWS)     Standing Status:   Future     Number of Occurrences:   1     Standing Expiration Date:   10/11/2020    OR ARTHROCENTESIS ASPIR&/INJ MAJOR JT/BURSA W/O US       Injection:  After discussing the risks and benefits of cortisone injection including increased pain, drug reaction, infection, bleeding, blood glucose elevation, lack of improvement and neurovascular injury she agreed to receive one today. Questions were encouraged and answered. The correct patient, procedure, site and side were identified. The left knee was prepped with Betadine and 2 mL of betamethasone (12mg) mixed with 5 mL 1% lidocaine plain (50mg) were instilled with careful aspiration and injection under aseptic technique. The skin was then cleaned again with alcohol and a sterile adhesive dressing was applied. She tolerated this well and was instructed regarding post-injection care of icing and decreased activity as necessary. Treatment Plan:          Patient presented today with somewhat acute onset left knee pain that began without any specific injury. She has been having right hip pain that is being treated by Dr. Rajesh Bean and Dr. Rainey Lesch. She feels this is affecting the way she is walking which caused her left knee pain. X-rays show moderate to severe patellofemoral arthritis although most of her pain is over the medial joint line which raises the concern for medial meniscal pathology. She has been trying ibuprofen with little relief of her pain. We discussed additional conservative treatment options and she elected to proceed with cortisone injection. This was completed as recorded above in the procedure note. She is given a handout of home exercises and stretches. She is advised to follow-up in 4 weeks or before that time with any concerns. We could consider formal physical therapy if pain fails to improve. Rashad Lucia was informed of the results of any imaging. We discussed treatment options and a time was given to answer questions. A plan was proposed and Rashad Lucia understand and accepts this course of care.           Electronically signed by Sunny Cuadra PA-C on 9/02/7337  Board Certified [facility]    Please note that portions of this note were completed with a voice recognition program.  Efforts were made to edit the dictations but occasionally words are mis-transcribed.

## 2020-09-14 ENCOUNTER — TELEPHONE (OUTPATIENT)
Dept: ORTHOPEDIC SURGERY | Age: 60
End: 2020-09-14

## 2020-09-14 NOTE — TELEPHONE ENCOUNTER
She wanted to get her XR results she would like a call back asap.   She can be reached at [phone]
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
## 2020-09-16 ENCOUNTER — OFFICE VISIT (OUTPATIENT)
Dept: ORTHOPEDIC SURGERY | Age: 60
End: 2020-09-16
Payer: MEDICARE

## 2020-09-16 VITALS — WEIGHT: 210 LBS | HEIGHT: 63 IN | TEMPERATURE: 97.3 F | BODY MASS INDEX: 37.21 KG/M2

## 2020-09-16 PROCEDURE — 99213 OFFICE O/P EST LOW 20 MIN: CPT | Performed by: ORTHOPAEDIC SURGERY

## 2020-09-16 RX ORDER — CITALOPRAM 20 MG/1
20 TABLET ORAL DAILY
COMMUNITY
Start: 2020-08-26 | End: 2020-11-02

## 2020-10-02 ENCOUNTER — OFFICE VISIT (OUTPATIENT)
Dept: ORTHOPEDIC SURGERY | Age: 60
End: 2020-10-02
Payer: MEDICARE

## 2020-10-02 VITALS — BODY MASS INDEX: 37.23 KG/M2 | TEMPERATURE: 96.8 F | HEIGHT: 63 IN | WEIGHT: 210.1 LBS | RESPIRATION RATE: 14 BRPM

## 2020-10-02 PROCEDURE — 99204 OFFICE O/P NEW MOD 45 MIN: CPT | Performed by: PHYSICAL MEDICINE & REHABILITATION

## 2020-10-02 NOTE — PROGRESS NOTES
New Patient: SPINE    Referring Provider:  Jason Bear, *    Chief Complaint   Patient presents with    Lower Back Pain     NP LSP - 3 yrs. (-) STEPHAN. constant pain. wakes her up. medial quadriceps pain. hard to lie on her R side.  Hip Pain     R HIPS       HISTORY OF PRESENT ILLNESS:      · The patient is being sent at the request of Jada Nicholson, * in consultation as a new spine patient for low back pain and right leg pain The patient is a 56 y.o. female whom reports she has chronic right hip pain and underwent a CT of the lumbar spine in June of 2020. She has seen Dr. Aaron Bro and reports no relief with PRP in 2018 for hip pain with little relief. She has seen Dr Lynette Santos and underwent a cortisone shot for the left knee. She has had x-rays of her hips on 5/29/20 as well. She has tried PT in the past. She has an interstimulator so she cannot undergo a MRI of the lumbar spine.      Pain Assessment  Location of Pain: Back(lsp)  Location Modifiers: Right(HIPS)  Severity of Pain: 7  Quality of Pain: Throbbing, Sharp, Aching  Duration of Pain: Persistent  Frequency of Pain: Constant  Date Pain First Started: (3 YRS)  Aggravating Factors: Standing(SITTING, MOST MOVEMENTS)  Limiting Behavior: Yes  Result of Injury: No  Work-Related Injury: No  Are there other pain locations you wish to document?: No      Associated signs and symptoms:   Neurogenic bowel or bladder symptoms:  no   Perceived weakness:  yes   Difficulty walking:  yes    Recent Imaging (within past one year)   Xrays: yes   MRI or CT of spine: yes    Current/Past Treatment:   · Physical Therapy:  yes  · Chiropractic:  none  · Injection:  none  · Medications:   NSAIDS:  yes   Muscle relaxer:  none   Steriods:  none   Neuropathic medications:  none   Opioids:  Tramadol  · Previous surgery:  no  · Previous surgical consult:  no  · Other:  · Infection control  · Tested positive for MRSA in past 12 months:  no  · Tested positive for MSSA \"staph infection\" in past 12 months: no  · Tested positive for VRE (Vancomycin Resistant Enterococci) in past 12 months:   no  · Currently on any antibiotics for an infection: no  · Anticoagulants:  · On a blood thinner:  no   · Any history of bleeding disorder: no   · MRI Contraindication: no   · Previous Pain Management: no     Past medical, surgical, social and family history reviewed with the patient. No pertinent relevant history            Past Medical History:   Past Medical History:   Diagnosis Date    Hyperlipidemia     Thyroid disease       Past Surgical History:     Past Surgical History:   Procedure Laterality Date     SECTION      x2    CHOLECYSTECTOMY      CYSTOSCOPY  13    CYSTOSCOPY URETHRAL DILATATION HYDRODISTENTION OF BLADDER    KIDNEY REMOVAL Left     TUBAL LIGATION       Current Medications:     Current Outpatient Medications:     citalopram (CELEXA) 20 MG tablet, Take 20 mg by mouth daily, Disp: , Rfl:     atorvastatin (LIPITOR) 40 MG tablet, Take 40 mg by mouth daily, Disp: , Rfl:     ibuprofen (ADVIL;MOTRIN) 800 MG tablet, Take 1 tablet by mouth every 8 hours as needed for Pain, Disp: 90 tablet, Rfl: 1    celecoxib (CELEBREX) 200 MG capsule, Take 1 capsule by mouth daily as needed for Pain, Disp: 30 capsule, Rfl: 1    levothyroxine (SYNTHROID) 100 MCG tablet, TAKE 1 TABLET (100 MCG TOTAL) BY MOUTH DAILY. , Disp: , Rfl: 0    omeprazole (PRILOSEC) 20 MG delayed release capsule, TAKE 1 CAPSULE (20 MG TOTAL) BY MOUTH DAILY. , Disp: , Rfl: 3  Allergies:  Phenergan [promethazine hcl] and Sulfa antibiotics  Social History:    reports that she has never smoked. She has never used smokeless tobacco. She reports that she does not drink alcohol or use drugs.   Family History:   Family History   Problem Relation Age of Onset    Diabetes type 2  Mother     Cancer Father        REVIEW OF SYSTEMS: ROS - 14 point    Constitutional: No fevers, chills, night sweats, unexplained weight loss  Eye: No vision changes or diplopia  ENT: No nasal congestion, postnasal drip or sore throat. No tinnitus  Respiratory: No cough or SOB  CV: No chest pain or palpitations  GI: No nausea, abdominal pain, stool changes  : No dysuria or hematuria  Skin: No new or changing skin lesions, no rashes  MSK: No joint swelling, morning stiffness, unusual joint pain  Neurological: No headache, confusion, syncope  Psychiatric: No excessive anxiety or depression  Endocrine: No polyuria or polydipsia  Hematologic: No lymph node enlargement or excessive bleeding  Immunologic:No history of immune deficiency or immunomodulating drugs           PHYSICAL EXAM:    Vitals: Temperature 96.8 °F (36 °C), resp. rate 14, height 5' 2.99\" (1.6 m), weight 210 lb 1.6 oz (95.3 kg), not currently breastfeeding. GENERAL EXAM:  · General Apparence: Patient is adequately groomed with no evidence of malnutrition. · Psychiatric: Orientation: The patient is oriented to time, place and person. The patient's mood and affect are appropriate   · Vascular: Examination reveals no swelling and palpation reveals no tenderness in upper or lower extremities. Good capillary refill. · The lymphatic examination of the neck, axillae and groin reveals all areas to be without enlargement or induration   Sensation is intact without deficit in the upper and lower extremities to light touch and pinprick  · Coordination of the upper and lower extremities are normal.    CERVICAL EXAMINATION:  · Inspection: Local inspection shows no step-off or bruising. Cervical alignment is normal. No instability is noted. · Palpation and Percussion: No evidence of tenderness at the midline. Paraspinal tenderness is not present. There is no paraspinal spasm. · Range of Motion:  limited by 25% in all planes due to pain   · Strength: 5/5 bilateral upper extremities  · Skin:There are no rashes, ulcerations or lesions.   · Reflexes: Bilaterally triceps, biceps and brachioradialis are 2+. Clonus absent bilaterally at the feet. No pathological reflexes are noted. · Gait & station:  antalgic on the right and no ataxia  · Additional Examinations:  · RIGHT UPPER EXTREMITY:  Inspection/examination of the right upper extremity does not show any tenderness, deformity or injury. Range of motion is normal and pain-free. There is no gross instability. There are no rashes, ulcerations or lesions. Strength and tone are normal. No atrophy or abnormal movements are noted. · LEFT UPPER EXTREMITY: Inspection/examination of the left upper extremity does not show any tenderness, deformity or injury. Range of motion is normal and pain-free. There is no gross instability. There are no rashes, ulcerations or lesions. Strength and tone are normal. No atrophy or abnormal movements are noted. LUMBAR/SACRAL EXAMINATION:  · Inspection: Local inspection shows no step-off or bruising. Lumbar alignment is normal. No instability is noted. · Palpation:   No evidence of tenderness at the midline. Lumbar paraspinal tenderness Mild L4/5 and L5/S1 tenderness  Bursal tenderness No tenderness bilaterally  There is no paraspinal spasm. · Range of Motion: limited by 25% in all planes due to pain  · Strength:   Strength testing is 5/5 in all muscle groups tested. · Special Tests:   Straight leg raise and crossed SLR negative. Ignacio's testing is negative bilaterally. FADIR's testing is negative bilaterally. Slump test negative. Bowstring test negative  · Skin: There are no rashes, ulcerations or lesions. · Reflexes: Reflexes are symmetrically 2+ at the patellar and ankle tendons. Clonus absent bilaterally at the feet. · Gait & station: antalgic on the right and no ataxia  · Additional Examinations:  · RIGHT LOWER EXTREMITY: Inspection/examination of the right lower extremity does not show any tenderness, deformity or injury. Range of motion is unremarkable. There is no gross instability.   There are no rashes, ulcerations or lesions. Strength and tone are normal. No atrophy or abnormal movements are noted. · LEFT LOWER EXTREMITY:  Inspection/examination of the left lower extremity does not show any tenderness, deformity or injury. Range of motion is unremarkable. There is no gross instability. There are no rashes, ulcerations or lesions. Strength and tone are normal. No atrophy or abnormal movements are noted. Diagnostic Testing:    Xrays:   None  MRI or CT:  CT Lumbar spine 6/15/20 -    1. No focal compressive disc herniation, spinal canal stenosis or high-grade foraminal    narrowing at any level.  No evidence of neural compression. 2. Subtle lumbar spine levocurvature with multilevel mild disc disease and multilevel mild    facet arthropathy.  Findings may contribute to patient's overall back pain.             EMG:  None  Results for orders placed or performed during the hospital encounter of 02/25/13   CBC   Result Value Ref Range    WBC 5.6 3.8 - 10.8 10*3/uL    RBC 4.14 3.80 - 5.10 10*6/uL    Hemoglobin 13.3 11.7 - 15.5 g/dL    Hematocrit 40.2 35.0 - 45.0 %    MCV 97.3 80.0 - 100.0 fL    MCH 32.1 27.0 - 33.0 pg    MCHC 33.0 32.0 - 36.0 g/dL    RDW 13.3 11.0 - 15.0 %    Platelets 415 241 - 996 10*3/uL    MPV 10.0 7.5 - 11.5 fL   Basic metabolic panel pre-op   Result Value Ref Range    Sodium 139 135 - 146 mmol/L    Potassium 4.2 3.5 - 5.3 mmol/L    Chloride 100 98 - 110 mmol/L    CO2 31 21 - 33 mmol/L    Anion Gap 8 3 - 16 mmol/L    BUN 11 7 - 25 mg/dL    CREATININE 0.76 0.50 - 1.20 mg/dL    Glucose 81 65 - 99 mg/dL    Calcium 8.9 8.6 - 10.2 mg/dL    GFR Est, African/Amer 96 See note. GFR Est 79 See note. 1/Creatinine 1.32 ratio   POC Pregnancy Urine Qual   Result Value Ref Range    Pregnancy, Urine Negative Negative       Impression (Medical Decision Making):       1. Lumbar radiculitis    2. Lumbar spondylosis    3.  Primary osteoarthritis of left knee        Plan (Medical Decision Making): I discussed the diagnosis and the treatment options with Jayro Mccoy today. In Summary:  The various treatment options were outlined and discussed with Jayro Mccoy including:  Conservative care options: physical therapy, ice, medications, bracing, and activity modification. The indications for therapeutic injections. The indications for additional imaging/laboratory studies. The indications for (possible future) interventions. After considering the various options discussed, Jayro Mccoy elected to pursue a course of treatment that includes the followin. Medications: Continue anti-inflammatories with appropriate GI Precautions including to stop if develop dark tarry stools or GI upset and to take with food. 2. PT:  Encouraged to continue with Home exercise program.    3. Further studies: COVID-19 PCR testing       4. Interventional:  We discussed pursuing a Right L4 and L5 Transforaminal KUMAR epidural steroid injection to address the pain. Radiologic imaging and symptoms confirm the pain etiology. Risks, benefits and alternatives of interventional options were discussed. These include and are not limited to bleeding, infection, spinal headache, nerve injury, increased pain and lack of pain relief. The patient verbalized understanding and would like to proceed. The patient will be scheduled accordingly. First Epidural steroid injection for therapeutic purposes    As such, I have confirmed the patient has met the general requirements including failure of conservative management including prescription strength analgesics, adjunctive medications were utilized , therapeutic exercise program, and no underlying addiction or behavioral disorders were identified to the ability of the provider. Symptoms are impacting their ADLs or iADLs such as walking, standing, transferring and significant pain was noted in the HPI.  Imaging studies as noted in the diagnostic imaging section of the consultation were reviewed and correlated with clinical findings. Fluoroscopy is utilized for interventional procedures. The patient presents with radicular pain or claudication type symptoms associated with functional impairment. Review of diagnostic imaging in the diagnostic studies section of the consultation shows nerve root compression and correlates with clinical findings. The patient has failed at least 4 weeks of appropriate conservative management. 5. Healthy Lifestyle Measures:  Patient education material reviewing the following was distributed to [employer]  Anatomic drawings  Healthy lifestyle education  Osteoporosis prevention,   Back and neck pain educational information   Advanced imaging preparedness    Posture education   Proper lifting and carrying techniques,   Weight management  Quitting smoking and   Minor ways to treat back pain  For further information regarding the spine conditions and to review interventional treatments the patient was directed to [employer].    6.  Follow up:  2-3 weeks    The [employer] was instructed to call the office if her symptoms worsen or if new symptoms appear prior to the next scheduled visit. She is specifically instructed to contact the office between now & her scheduled appointment if she has concerns related to her condition or if she needs assistance in scheduling the above tests. She is welcome to call for an appointment sooner if she has any additional concerns or questions. Chelsy Call. Ramón Aguilar MD, JOHN, [facility]  Board Certified in [address] [place] [address]  Certified and Fellowship Trained in [facility] of [address]     This dictation was performed with a verbal recognition program [facility]) and it was checked for errors. It is possible that there are still dictated errors within this office note.  If so, please bring any errors to my attention for an

## 2020-10-02 NOTE — LETTER
Please schedule the following with:     Date:   10/19/20 @ 10:00    Account: [de-identified]  Patient: Danielle Long    : 1960  Address:  [facility] [address]    Phone (H):  [phone] (home) [phone] (work)     ----------------------------------------------------------------------------------------------  Diagnosis:     ICD-10-CM    1. Lumbar radiculitis  M54.16    2. Lumbar spondylosis  M47.816    3. Primary osteoarthritis of left knee  M17.12          Levels:Right L4 and L5 Transforaminal KUMAR  CPT Codes [id number], [id number]    ----------------------------------------------------------------------------------------------  Injection # 1   [facility]    Attending Physician       Kylee Jin.  Duayne Apgar, MD.  ----------------------------------------------------------------------------------------------  Injection Scheduled For:    At:    [address] [place]  Pre-Cert#    [employer] Insurance     Pre-Cert#    Comments or Special instructions:    COVID TEST: yes    · Infection control  · Tested positive for MRSA in past 12 months:  no  · Tested positive for MSSA \"staph infection\" in past 12 months: no  · Tested positive for VRE (Vancomycin Resistant Enterococci) in past 12 months:   no  · Currently on any antibiotics for an infection: no  · Anticoagulants:  · On a blood thinner:  no   · Any history of bleeding disorder: no   · Advanced Liver disease: no   · Advanced Renal disease: no   · Glaucoma: no   · Diabetes: no     Sedation:  Yes  -----------------------------------------------------------------------------------------------  Allergies   Allergen Reactions    Phenergan [Promethazine Hcl]      Out of my head \"loopY\"    Sulfa Antibiotics Rash   '

## 2020-10-06 ENCOUNTER — TELEPHONE (OUTPATIENT)
Dept: ORTHOPEDIC SURGERY | Age: 60
End: 2020-10-06

## 2020-10-07 NOTE — PROGRESS NOTES
Amina Hall  [id number]  Encounter Date: 2020  YOB: 1960    Chief Complaint   Patient presents with    Follow-up     Right hip CT results. History:Ms. Back is here in follow up regarding her lumbar spine CT results and persistent right thigh / hip pain, please see notes from 2020 regarding prior treatment and work up of her hip / lumbar symptoms. No new injuries since her last visit. Exam:  Temp 97.3 °F (36.3 °C) (Infrared)   Ht 5' 3\" (1.6 m)   Wt 210 lb (95.3 kg)   BMI 37.20 kg/m²   General: Alert and oriented x3, No acute distress, Cooperative and conversant. Obesity Present  Mood and affect are appropriate. Right hip: Normal muscle contours for her age and activity level. No gross atrophy. Mild tenderness in the trochanteric bursal region. Tolerates hip flexion to 90 degrees and abduction of 40 degrees in the seated position. No pain on logroll the hip in a seated position. Gross motor strength on hip flexion, abduction and adduction 4/5. Gait: Decreased stride length and weight shift favoring her right side especially when she first gets up from a chair. Capillary refill < 2 seconds and palpable distal pulses  Skin: no erythema, lesions or rashes  No signs / symptoms of DVT / PE or infection    CT scan lumbar spine 6/15/2020:  Site: [facility] #: [id number] #: [id number] Procedure: CT Lumbar Spine w/o Contrast ; Reason for Exam: Pain, Radicular,Lumbar    This document is confidential medical information.  Unauthorized disclosure or use of this information is prohibited by law. If you are not the intended recipient of this document, please advise us by calling immediately [phone].         [employer] [address] [place], [place] [address]r [facility] [employer] [facility]              Patient Name: Amina Hall    Case ID: [id number]    Patient : 1960    Referring Physician: Meron Palumbo MD    Exam Date: 06/15/2020    Exam Description: CT Lumbar Spine w/o Contrast              HISTORY:  Pain, radicular, lumbar.  Pain in right hip and leg x 3 years.  No injury.  No    surgery to spine.         TECHNICAL FACTORS:  Standard CT technique was utilized.         COMPARISON:  None.         FINDINGS:  No evidence of lumbar spine fracture and vertebral body heights are maintained.      Intervertebral disc spaces are preserved.  Small Schmorl's are present at several levels.      Endplates are intact.         Thoracolumbar junction is intact.  Subtle lumbar spine levocurvature.  No spondylolisthesis.      Anterior and middle columns appear intact.         No evidence of aggressive lytic or blastic osseous lesion.         L1-L2: No focal disc herniation or spinal canal stenosis.  Neural foramina are patent.  Mild    facet arthropathy.         L2-L3: Shallow disc bulge without spinal canal stenosis.  Neural foramina are patent.  Mild    facet arthropathy.         L3-L4: Shallow disc bulge without spinal canal stenosis. Neural foramina are patent. Mild facet     arthropathy.         L4-L5: Shallow disc bulge without spinal canal stenosis.  Neural foramina are patent.  Mild    facet arthropathy.         L5-S1: Shallow disc bulge without spinal canal stenosis.  Neural foramina are patent.  Mild    facet arthropathy.         Prevertebral and paraspinal soft tissues are normal.         Patient is post remote cholecystectomy.         Scattered mild atherosclerotic calcification throughout the abdominal aorta and arborizing    branches.         Partially visualized stimulator device and wire along the right lower posterior soft tissues    with a wire extending into the right S2-S3 foramen.         Mild sacroiliac arthropathy with vacuum phenomenon.                        CONCLUSION:    1. No focal compressive disc herniation, spinal canal stenosis or high-grade foraminal    narrowing at any level.  No evidence of neural compression.     2. Subtle lumbar spine levocurvature with multilevel mild disc disease and multilevel mild    facet arthropathy.  Findings may contribute to patient's overall back pain.         Thank you for the opportunity to provide your interpretation. Assessment:   Diagnosis Orders   1. Lumbar spondylosis  Joanne Villalba MD, Spine Surgery, [facility]       Orders  Orders Placed This Encounter   Procedures   Joanne Villalba MD, Spine Surgery, [facility]       Plan:  We discussed treatment options today. Based on her symptoms and exam as well as the mild level of arthritic change in her hip joint radiographically, I still would recommend consideration for epidural injections due to the radicular nature of her weakness and pain. I explained to her that these are certainly far less invasive than trialing a hip replacement to see if her symptoms will improve. Unfortunately her pain and dysfunction did not correlate well with her hip x-rays. We will refer her to Dr. Philippe Dodd for consideration of lumbar epidural injections. She understands and accepts this course of care. Documentation was done using voice recognition dragon software. Every effort was made to ensure accuracy; however, inadvertent  Unintentional computerized transcription errors may be present.

## 2020-10-13 ENCOUNTER — TELEPHONE (OUTPATIENT)
Dept: ORTHOPEDIC SURGERY | Age: 60
End: 2020-10-13

## 2020-10-13 NOTE — TELEPHONE ENCOUNTER
Auth: [id number]  Date: 10/19/2020 thru 10/30/2020  CPT: 25053, 47632  DX: M54.16, M47.816, M17.12   Outpatient  Procedure: KUMAR  SX Area: Lumbar Spine  SX Location: [facility]   Insurance: [facility] [address] Medicare      CPT: 33818 [id number], [id number]     [address] [facility]

## 2020-10-14 ENCOUNTER — OFFICE VISIT (OUTPATIENT)
Dept: PRIMARY CARE CLINIC | Age: 60
End: 2020-10-14
Payer: MEDICARE

## 2020-10-14 PROCEDURE — 99211 OFF/OP EST MAY X REQ PHY/QHP: CPT | Performed by: NURSE PRACTITIONER

## 2020-10-15 ENCOUNTER — TELEPHONE (OUTPATIENT)
Dept: ORTHOPEDIC SURGERY | Age: 60
End: 2020-10-15

## 2020-10-15 LAB — SARS-COV-2, PCR: NOT DETECTED

## 2020-10-15 NOTE — TELEPHONE ENCOUNTER
PATIENT IS CALLING WANTING TO KNOW IF SHE IS SUPPOSE TO TAKE ANTIBIOTICS BEFORE HER PROCEDURE. PLEASE CALL TO ADVISE HER -[phone].

## 2020-10-15 NOTE — RESULT ENCOUNTER NOTE

## 2020-10-19 ENCOUNTER — APPOINTMENT (OUTPATIENT)
Dept: GENERAL RADIOLOGY | Age: 60
End: 2020-10-19
Attending: PHYSICAL MEDICINE & REHABILITATION
Payer: MEDICARE

## 2020-10-19 ENCOUNTER — HOSPITAL ENCOUNTER (OUTPATIENT)
Age: 60
Setting detail: OUTPATIENT SURGERY
Discharge: HOME | End: 2020-10-19
Attending: PHYSICAL MEDICINE & REHABILITATION | Admitting: PHYSICAL MEDICINE & REHABILITATION
Payer: MEDICARE

## 2020-10-19 VITALS
HEART RATE: 82 BPM | OXYGEN SATURATION: 98 % | BODY MASS INDEX: 37.21 KG/M2 | HEIGHT: 63 IN | DIASTOLIC BLOOD PRESSURE: 78 MMHG | WEIGHT: 210 LBS | SYSTOLIC BLOOD PRESSURE: 120 MMHG | TEMPERATURE: 97.9 F | RESPIRATION RATE: 16 BRPM

## 2020-10-19 PROCEDURE — 2500000003 HC RX 250 WO HCPCS: Performed by: PHYSICAL MEDICINE & REHABILITATION

## 2020-10-19 PROCEDURE — 99152 MOD SED SAME PHYS/QHP 5/>YRS: CPT | Performed by: PHYSICAL MEDICINE & REHABILITATION

## 2020-10-19 PROCEDURE — 3209999900 FLUORO FOR SURGICAL PROCEDURES

## 2020-10-19 PROCEDURE — 3610000056 HC PAIN LEVEL 4 BASE (NON-OR): Performed by: PHYSICAL MEDICINE & REHABILITATION

## 2020-10-19 PROCEDURE — 6360000002 HC RX W HCPCS: Performed by: PHYSICAL MEDICINE & REHABILITATION

## 2020-10-19 PROCEDURE — 2709999900 HC NON-CHARGEABLE SUPPLY: Performed by: PHYSICAL MEDICINE & REHABILITATION

## 2020-10-19 RX ORDER — FENTANYL CITRATE 50 UG/ML
INJECTION, SOLUTION INTRAMUSCULAR; INTRAVENOUS
Status: COMPLETED | OUTPATIENT
Start: 2020-10-19 | End: 2020-10-19

## 2020-10-19 RX ORDER — MIDAZOLAM HYDROCHLORIDE 1 MG/ML
INJECTION INTRAMUSCULAR; INTRAVENOUS
Status: COMPLETED | OUTPATIENT
Start: 2020-10-19 | End: 2020-10-19

## 2020-10-19 RX ORDER — LIDOCAINE HYDROCHLORIDE 10 MG/ML
INJECTION, SOLUTION INFILTRATION; PERINEURAL
Status: COMPLETED | OUTPATIENT
Start: 2020-10-19 | End: 2020-10-19

## 2020-10-19 RX ORDER — BETAMETHASONE SODIUM PHOSPHATE AND BETAMETHASONE ACETATE 3; 3 MG/ML; MG/ML
INJECTION, SUSPENSION INTRA-ARTICULAR; INTRALESIONAL; INTRAMUSCULAR; SOFT TISSUE
Status: COMPLETED | OUTPATIENT
Start: 2020-10-19 | End: 2020-10-19

## 2020-10-19 RX ORDER — BUPIVACAINE HYDROCHLORIDE 5 MG/ML
INJECTION, SOLUTION EPIDURAL; INTRACAUDAL
Status: COMPLETED | OUTPATIENT
Start: 2020-10-19 | End: 2020-10-19

## 2020-10-19 ASSESSMENT — PAIN - FUNCTIONAL ASSESSMENT
PAIN_FUNCTIONAL_ASSESSMENT: 0-10
PAIN_FUNCTIONAL_ASSESSMENT: PREVENTS OR INTERFERES SOME ACTIVE ACTIVITIES AND ADLS

## 2020-10-19 ASSESSMENT — PAIN DESCRIPTION - DESCRIPTORS: DESCRIPTORS: ACHING

## 2020-10-19 ASSESSMENT — PAIN SCALES - GENERAL
PAINLEVEL_OUTOF10: 0
PAINLEVEL_OUTOF10: 0

## 2020-10-19 NOTE — H&P
with the patient and or family. The patient and or next of kin understands and agrees to proceed.     Abdullahi Elaine M.D.

## 2020-10-19 NOTE — PROGRESS NOTES
Teaching / education initiated regarding perioperative experience, expectations, and pain management during stay. Patient verbalized understanding.   Nella Bess RN

## 2020-10-23 ENCOUNTER — TELEPHONE (OUTPATIENT)
Dept: ORTHOPEDIC SURGERY | Age: 60
End: 2020-10-23

## 2020-10-26 ENCOUNTER — TELEPHONE (OUTPATIENT)
Dept: ORTHOPEDIC SURGERY | Age: 60
End: 2020-10-26

## 2020-10-26 NOTE — TELEPHONE ENCOUNTER
PATIENT WAS RETURNING CALL FROM Ina Irby  [phone]  AN SHE WOULD LIKE DR GUNDERSON TO GIVE HER A CALL A WILL

## 2020-10-26 NOTE — TELEPHONE ENCOUNTER
S/W PATIENT who requests that her KUMAR and other documentation to be sent to the [facility], advised that once request is received from the [facility], this will be completed. Patient voiced understanding of the conversation and will contact the office with further questions or concerns. Message forwarded to patient advocates to ensure paperwork/request was received, as the last order of business for this was dated 10/6/2020 and completed on 10/23/2020.

## 2020-10-26 NOTE — TELEPHONE ENCOUNTER
FAXED [facility] / [facility] 05/2020 TO PRESENT TO THE [facility] DISABILITY @ [facility] @ [phone]

## 2020-10-29 ENCOUNTER — TELEPHONE (OUTPATIENT)
Dept: ORTHOPEDIC SURGERY | Age: 60
End: 2020-10-29

## 2020-10-29 ENCOUNTER — OFFICE VISIT (OUTPATIENT)
Dept: ORTHOPEDIC SURGERY | Age: 60
End: 2020-10-29
Payer: MEDICARE

## 2020-10-29 VITALS — WEIGHT: 210 LBS | HEIGHT: 63 IN | TEMPERATURE: 97 F | BODY MASS INDEX: 37.21 KG/M2

## 2020-10-29 PROCEDURE — 99213 OFFICE O/P EST LOW 20 MIN: CPT | Performed by: PHYSICIAN ASSISTANT

## 2020-10-29 NOTE — PROGRESS NOTES
Patient Name: Lawson España  Medical Record Number: [id number]  YOB: 1960  Date of Encounter: 10/29/2020     Chief Complaint   Patient presents with    Follow-up     L knee MMT       History of Present Illness:   Ms. Lawson España is here in follow up regarding her left knee. Patient was initially seen last month on 9/11/2020 stating she began having left knee pain 6 weeks prior without any known injury. Most of her pain at that time was along the medial aspect. She has been trying ibuprofen, Tylenol, rest, ice and elevation without relief of her pain. We discussed other conservative treatment options at that time and she elected to proceed with cortisone injection. X-rays completed on 9/11/2020 showed moderate tibiofemoral arthritis and severe patellofemoral arthritis. Patient presents today stating the cortisone injection did not help at all. She states her pain is still a 6/10. When I asked her where her pain is today she states all of her pain is along the anterior knee. She denies swelling of the left knee. She denies locking or catching. She has also been seeing Dr. Lakshmi Bhakta for unprovoked right hip pain. Bilateral hip x-rays are unremarkable. Pain was thought to be coming from her lumbar spine and therefore she was sent to Dr. Anila Laboy who performed lumbar epidural injections which patient states did not help at all with her pain. The patient's past medical history, medications, allergies, family history, social history, and review of systems have been reviewed, and dated and are recorded in the chart under the 'MEDIA\" tab. Physical Exam:    Ms. Lawson España appears well, she is in no apparent distress, she demonstrates appropriate mood & affect. She is alert and oriented to person, place and time. Temp 97 °F (36.1 °C)   Ht 5' 3\" (1.6 m)   Wt 210 lb (95.3 kg)   BMI 37.20 kg/m²     On examination of patient's left knee there is no swelling or joint effusion.   Patient actually denies tenderness on palpation of the left knee at today's visit. Range of motion is 0 to 120 degrees with 4+/5 motor strength. Special testing is still negative. Orders:  Orders Placed This Encounter   Procedures    MONOVISC INJECTION (For Auth/Precert)       Impression:   Diagnosis Orders   1. Primary osteoarthritis of left knee  MONOVISC INJECTION (For Auth/Precert)       Treatment Plan:    Patient presents today stating the left knee cortisone injection given at her last visit did not help at all. Her pain has now went from the medial aspect of her left knee to the anterior left knee. X-rays completed at her last visit showed moderate tibiofemoral arthritic changes and severe patellofemoral arthritis. We discussed trialing gel shots and we submitted to her insurance for approval.  She has been doing home exercises and stretches without relief of her pain. I recommend formal physical therapy however patient declined stating she wishes to continue with home exercises and stretches. Patient states she is still having right hip pain which she is seeing Dr. Ez Kyle for. Her right hip x-rays were unremarkable and hip pain was thought to be coming from her low back. She has seen Dr. Juve Bateman and had lumbar epidural injections which she states did not help at all. There is some concern her hip pain could be originating from her altered gait secondary to left knee pain. I advised patient to call the office if she changes her mind about formal physical therapy or would like to discuss additional treatment options. Stephon Vázquez was informed of the results of any imaging. We discussed treatment options and a time was given to answer questions. A plan was proposed and Stephon Vázquez understand and accepts this course of care.           Electronically signed by Patito Montanez PA-C on 56/81/8457  Board Certified [facility]    Please note that portions of this note were completed with a voice recognition program.  Efforts were made to edit the dictations but occasionally words are mis-transcribed.

## 2020-11-02 ENCOUNTER — TELEPHONE (OUTPATIENT)
Dept: ORTHOPEDIC SURGERY | Age: 60
End: 2020-11-02

## 2020-11-02 ENCOUNTER — OFFICE VISIT (OUTPATIENT)
Dept: ORTHOPEDIC SURGERY | Age: 60
End: 2020-11-02
Payer: MEDICARE

## 2020-11-02 VITALS — HEIGHT: 63 IN | TEMPERATURE: 97.1 F | RESPIRATION RATE: 14 BRPM | BODY MASS INDEX: 37.23 KG/M2 | WEIGHT: 210.1 LBS

## 2020-11-02 PROCEDURE — 99214 OFFICE O/P EST MOD 30 MIN: CPT | Performed by: STUDENT IN AN ORGANIZED HEALTH CARE EDUCATION/TRAINING PROGRAM

## 2020-11-02 NOTE — TELEPHONE ENCOUNTER
11/02/2020  DUROLANE   (QTY 1)    LEFT  KNEE. APPROVED # [id number]. DATES: 11/2/2020 - 1/01/2021. PER FAX FROM [employer]. NOTE: Original request was for non-preferred drug MONOVISC . Okay to switch to preferred drug DUROLANE, Per Skye Villalobos .  TIFFANY ALLEN

## 2020-11-02 NOTE — TELEPHONE ENCOUNTER
FAXED ( IN Epic) [facility] ( [facility] ) 10/19/2020 OP REPORT TO [facility] @ [phone]

## 2020-11-02 NOTE — PROGRESS NOTES
Follow up: Funmilayo Fowler  1960  [id number]      CHIEF COMPLAINT:    Chief Complaint   Patient presents with    Lower Back Pain     10/19/2020: R L4 + L5 TF          HISTORY OF PRESENT ILLNESS:  Ms. Cortes Ruiz is a 56 y.o. female returns for a follow up visit for multiple medical problems. Her current presenting problems are   1. Lumbar radiculitis    2. Lumbar spondylosis    3. Primary osteoarthritis of left knee    . As per information/history obtained from the PADT(patient assessment and documentation tool) - She complains of pain in the lower back with radiation to the lower leg Right She rates the pain 9/10 and describes it as aching, burning, throbbing. Pain is made worse by: walking, standing, sitting, bending. She denies side effects from the current pain regimen. Patient reports that since the last follow up visit the physical functioning is unchanged, family/social relationships are unchanged, mood is unchanged and sleep patterns are unchanged, and that the overall functioning is unchanged. Patient denies neurological bowel or bladder. Ms. Cortes Ruiz presents for ongoing low back and right leg pain. She recently underwent a right L4 and L5 transforaminal epidural steroid injection on 10/19/2020 she reports 60% relief for 1.5 days following the procedure. The patient states her pain returned to pre procedural levels the following night. She complains of low back pain with radiation down the front of the right leg to the knee. She states everything makes her pain worse including walking, standing, sitting and bending. She is unable to identify any alleviating factors. The patient cannot sit, stand or walk for any amount of time without a considerable amount of pain.     Associated signs and symptoms:   Neurogenic bowel or bladder symptoms:  no   Perceived weakness:  yes   Difficulty walking:  yes              Past Medical History:   Past Medical History:   Diagnosis Date    Hyperlipidemia     Thyroid disease       Past Surgical History:     Past Surgical History:   Procedure Laterality Date     SECTION      x2    CHOLECYSTECTOMY      CYSTOSCOPY  13    CYSTOSCOPY URETHRAL DILATATION HYDRODISTENTION OF BLADDER    KIDNEY REMOVAL Left     PAIN MANAGEMENT PROCEDURE Right 10/19/2020    RIGHT L4 AND L5 TRANSFORAMINAL EPIDURAL STEROID INJECTION WITH FLUOROSCOPY ([id number], 01433) performed by Dolores Liu MD at [address]       Current Medications:     Current Outpatient Medications:     atorvastatin (LIPITOR) 40 MG tablet, Take 40 mg by mouth daily, Disp: , Rfl:     ibuprofen (ADVIL;MOTRIN) 800 MG tablet, Take 1 tablet by mouth every 8 hours as needed for Pain, Disp: 90 tablet, Rfl: 1    levothyroxine (SYNTHROID) 100 MCG tablet, TAKE 1 TABLET (100 MCG TOTAL) BY MOUTH DAILY. , Disp: , Rfl: 0  Allergies:  Phenergan [promethazine hcl] and Sulfa antibiotics  Social History:    reports that she has never smoked. She has never used smokeless tobacco. She reports that she does not drink alcohol or use drugs. Family History:   Family History   Problem Relation Age of Onset    Diabetes type 2  Mother     Cancer Father        REVIEW OF SYSTEMS:   CONSTITUTIONAL: Denies unexplained weight loss, fevers, chills or fatigue  NEUROLOGICAL: Denies unsteady gait or progressive weakness  MUSCULOSKELETAL: Denies joint swelling or redness  GI: Denies nausea, vomiting, diarrhea   : Denies bowel or bladder issues       PHYSICAL EXAM:    Vitals: Temperature 97.1 °F (36.2 °C), resp. rate 14, height 5' 2.99\" (1.6 m), weight 210 lb 1.6 oz (95.3 kg), not currently breastfeeding. GENERAL EXAM:  · General Apparence: Patient is adequately groomed with no evidence of malnutrition. · Psychiatric: Orientation: The patient is oriented to time, place and person.  The patient's mood and affect are appropriate   · Vascular: Examination reveals no swelling and palpation reveals no tenderness in upper or lower extremities. Good capillary refill. · The lymphatic examination of the neck, axillae and groin reveals all areas to be without enlargement or induration  · Sensation is intact without deficit in the upper and lower extremities to light touch and pinprick  · Coordination of the upper and lower extremities are normal.  · RIGHT UPPER EXTREMITY:  Inspection/examination of the right upper extremity does not show any tenderness, deformity or injury. Range of motion is unremarkable and pain-free. There is no gross instability. There are no rashes, ulcerations or lesions. Strength and tone are normal. No atrophy or abnormal movements are noted. · LEFT UPPER EXTREMITY: Inspection/examination of the left upper extremity does not show any tenderness, deformity or injury. Range of motion is unremarkable and pain-free. There is no gross instability. There are no rashes, ulcerations or lesions. Strength and tone are normal. No atrophy or abnormal movements are noted. LUMBAR/SACRAL EXAMINATION:  · Inspection: Local inspection shows no step-off or bruising. Lumbar alignment is normal. No instability is noted. · Palpation:   No evidence of tenderness at the midline. Lumbar paraspinal tenderness: Mild L4/5 and L5/S1 tenderness  Bursal tenderness No tenderness bilaterally  There is no paraspinal spasm. · Range of Motion: limited by 50% in all planes due to pain  · Strength:   Strength testing is 5/5 in all muscle groups tested. · Special Tests:   Straight leg raise positive right and crossed SLR negative. Ignacio's testing is negative bilaterally. FADIR's testing is negative bilaterally. · Skin: There are no rashes, ulcerations or lesions. · Reflexes: Reflexes are symmetrically 1+ at the patellar and ankle tendons. Clonus absent bilaterally at the feet.   · Gait & station: antalgic on the right  · Additional Examinations:  · RIGHT LOWER EXTREMITY: Inspection/examination of the right lower studies. The indications for (possible future) interventions. After considering the various options discussed, Frank Miller elected to pursue a course of treatment that includes the followin. Medications:  Continue anti-inflammatories with appropriate GI Precautions including to stop if develop dark tarry stools or GI upset and to take with food. 2. PT:  Encouraged to continue with HEP. 3. Further studies:  No further studies. 4. Interventional:  We discussed pursuing a right L4 and L5 transforaminal epidural steroid injection to address the pain. Radiologic imaging and symptoms confirm the pain etiology. Risks, benefits and alternatives of interventional options were discussed. These include and are not limited to bleeding, infection, spinal headache, nerve injury and lack of pain relief. The patient verbalized understanding and would like to proceed. The patient will be scheduled accordingly. Repeat Therapeutic KUMAR with positive relief from first therapeutic injection    As such, I have confirmed the patient has met the general requirements including failure of conservative management including prescription strength analgesics, adjunctive medications were utilized , therapeutic exercise program, and no underlying addiction or behavioral disorders were identified to the ability of the provider. Symptoms are impacting their ADLs or iADLs such as walking and transferring and significant pain was noted in the HPI. Imaging studies as noted in the diagnostic imaging section of the consultation were reviewed and correlated with clinical findings. Fluoroscopy is utilized for interventional procedures. A repeat injection is being recommended due to at least 50% pain reduction and functional improvement of at least 3 weeks duration. The patient notes significant functional limitations and continues to adhere to conservative treatment between injections.      We did discuss spinal

## 2020-11-02 NOTE — TELEPHONE ENCOUNTER
FAXED ( IN Epic ) [facility] / [address] ( [facility] ) 10/29/2020 TO [facility] @ [facility] @ [phone]

## 2020-11-02 NOTE — TELEPHONE ENCOUNTER
LORI THOMPSON STAFF VENAKT COMPLETED MEDICAL RECORDS [facility], [facility]) FOR PATIENT .     PATIENT WILL NEED TO SIGN A RELEASE (INCLUDED)

## 2020-11-03 ENCOUNTER — TELEPHONE (OUTPATIENT)
Dept: ORTHOPEDIC SURGERY | Age: 60
End: 2020-11-03

## 2020-11-03 NOTE — TELEPHONE ENCOUNTER
Called and informed pt of ok to proceed w/gel injections prior to [facility]. Pt asked if the injection would feel different. I informed her that the injection was performed the same as the steroid, however the medication is a little thicker. Pt asked if she should have someone drive her. I informed her that typically, pts drive themselves home after.

## 2020-11-03 NOTE — TELEPHONE ENCOUNTER
General Question     Subject: Epidural inj/Knee inj  Patient and /or Facility Request: She wanted to know will the knee inj interfere with her Epidural inj she has schd on 11/16.   Contact Number: [phone]

## 2020-11-04 ENCOUNTER — OFFICE VISIT (OUTPATIENT)
Dept: ORTHOPEDIC SURGERY | Age: 60
End: 2020-11-04
Payer: MEDICARE

## 2020-11-04 ENCOUNTER — TELEPHONE (OUTPATIENT)
Dept: ORTHOPEDIC SURGERY | Age: 60
End: 2020-11-04

## 2020-11-04 VITALS — TEMPERATURE: 97.9 F | BODY MASS INDEX: 37.21 KG/M2 | WEIGHT: 210 LBS | HEIGHT: 63 IN

## 2020-11-04 PROCEDURE — 20610 DRAIN/INJ JOINT/BURSA W/O US: CPT | Performed by: PHYSICIAN ASSISTANT

## 2020-11-04 NOTE — PROGRESS NOTES
or OTC pain relievers as needed. Follow-up as directed or call or return to clinic if these symptoms worsen or fail to improve as anticipated. If at any time you are concerned you may contact the office for further instructions or care. Electronically signed by Corona Mcdermott PA-C on 98/9/1943  Board Certified [facility]    Please note that portions of this note were completed with a voice recognition program.  Efforts were made to edit the dictations but occasionally words are mis-transcribed.

## 2020-11-09 ENCOUNTER — TELEPHONE (OUTPATIENT)
Dept: ORTHOPEDIC SURGERY | Age: 60
End: 2020-11-09

## 2020-11-09 NOTE — TELEPHONE ENCOUNTER
MIRTA CALLED FROM  MEDICAL REVIEW ASKING IF DR GUEVARA HAS ANY NECESSARY LIMITATIONS OR RESTRICTIONS TO ADD TO THIS DISABILITY CLAIM? PLEASE RETURN THE CALL [phone]. IF YOU GET A VM LEAVE YOUR CELL PHONE NUMBER AND DR. Shadi Iglesias WILL RETURN YOUR CALL.

## 2020-11-10 ENCOUNTER — TELEPHONE (OUTPATIENT)
Dept: ORTHOPEDIC SURGERY | Age: 60
End: 2020-11-10

## 2020-11-10 NOTE — TELEPHONE ENCOUNTER
Auth: # [id number]    Date: 11/16/2020 thru 12/01/2020  Type of SX:  Outpatient KUMAR  Location: [facility]  CPT: 17816, 67124   DX Code: M54.16, M47.816, M17.12  SX area: Lumbar spine  Insurance: [id number] Medicare    CPT: [address]873 [address] [address]

## 2020-11-10 NOTE — PROGRESS NOTES
PATIENT REACHED   YES____NO__X__    PREOP INSTUCTIONS LEFT ON  [id number]___[phone]____________      DATE___11/16/2020______ TIME___0910______ARRIVAL__0810______PLACE___[facility]_________  NOTHING TO EAT OR DRINK  AFTER MIDNIGHT THE EVENING PRIOR OR AS INSTRUCTED BY YOUR DR.  Brissa Elfego NEED A RESPONSIBLE ADULT AGE 18 OR OLDER TO DRIVE YOU HOME  PLEASE BRING INSURANCE CARD. PICTURE ID AND COMPLETE LIST OF MEDS  WEAR LOOSE COMFORTABLE CLOTHING  FOLLOW ANY INSTRUCTIONS YOUR D[facility] OFFICE HAS GIVEN YOU,INCLUDING WHAT MEDICATIONS TO TAKE THE AM OF PROCEDURE AND WHEN AND IF YOU NEED TO STOP ANY BLOOD THINNERS. IF YOU HAVE QUESTIONS REGARDING THIS CALL THE OFFICE  THE GOAL BLOOD SUGAR THE AM OF PROCEDURE  OR LESS ABOVE THAT THE PROCEDURE MAY BE CANCELLED  ANY QUESTIONS CALL YOUR DOCTOR. ALSO,PLEASE READ THE INSTRUCTION PACKET FROM YOUR DR IF YOU RECEIVED ONE. SPINE INTERVENTION NUMBER [phone]  There is a one visitor policy at [facility] for all surgeries and endoscopies. Whether the visitor can stay or will be asked to wait in the car will depend on the current policy and if social distancing can be maintained. The policy is subject to change at any time. Please make sure the visitor has a cell phone that is on,charged and able to accept calls, as this may be the way that the staff communicates with them. Pain management is NO VISITOR policyThe patients ride is expected to remain in the car with a cell phone for communication. If the ride is leaving the hospital grounds please make sure they are back in time for pickup. Have the patient inform the staff on arrival what their rides plans are while the patient is in the facility. At the MAIN there is one visitor allowed. Please note that the visitor policy is subject to change.

## 2020-11-11 ENCOUNTER — TELEPHONE (OUTPATIENT)
Dept: ORTHOPEDIC SURGERY | Age: 60
End: 2020-11-11

## 2020-11-16 ENCOUNTER — APPOINTMENT (OUTPATIENT)
Dept: GENERAL RADIOLOGY | Age: 60
End: 2020-11-16
Attending: PHYSICAL MEDICINE & REHABILITATION
Payer: MEDICARE

## 2020-11-16 ENCOUNTER — HOSPITAL ENCOUNTER (OUTPATIENT)
Age: 60
Setting detail: OUTPATIENT SURGERY
Discharge: HOME | End: 2020-11-16
Attending: PHYSICAL MEDICINE & REHABILITATION | Admitting: PHYSICAL MEDICINE & REHABILITATION
Payer: MEDICARE

## 2020-11-16 VITALS
SYSTOLIC BLOOD PRESSURE: 120 MMHG | WEIGHT: 210 LBS | BODY MASS INDEX: 37.21 KG/M2 | HEIGHT: 63 IN | TEMPERATURE: 98 F | DIASTOLIC BLOOD PRESSURE: 84 MMHG | HEART RATE: 87 BPM | RESPIRATION RATE: 16 BRPM | OXYGEN SATURATION: 100 %

## 2020-11-16 PROCEDURE — 2500000003 HC RX 250 WO HCPCS: Performed by: PHYSICAL MEDICINE & REHABILITATION

## 2020-11-16 PROCEDURE — 2709999900 HC NON-CHARGEABLE SUPPLY: Performed by: PHYSICAL MEDICINE & REHABILITATION

## 2020-11-16 PROCEDURE — 99152 MOD SED SAME PHYS/QHP 5/>YRS: CPT | Performed by: PHYSICAL MEDICINE & REHABILITATION

## 2020-11-16 PROCEDURE — 3610000056 HC PAIN LEVEL 4 BASE (NON-OR): Performed by: PHYSICAL MEDICINE & REHABILITATION

## 2020-11-16 PROCEDURE — 3209999900 FLUORO FOR SURGICAL PROCEDURES

## 2020-11-16 PROCEDURE — 6360000002 HC RX W HCPCS: Performed by: PHYSICAL MEDICINE & REHABILITATION

## 2020-11-16 RX ORDER — LIDOCAINE HYDROCHLORIDE 10 MG/ML
INJECTION, SOLUTION INFILTRATION; PERINEURAL
Status: COMPLETED | OUTPATIENT
Start: 2020-11-16 | End: 2020-11-16

## 2020-11-16 RX ORDER — FENTANYL CITRATE 50 UG/ML
INJECTION, SOLUTION INTRAMUSCULAR; INTRAVENOUS
Status: COMPLETED | OUTPATIENT
Start: 2020-11-16 | End: 2020-11-16

## 2020-11-16 RX ORDER — MIDAZOLAM HYDROCHLORIDE 1 MG/ML
INJECTION INTRAMUSCULAR; INTRAVENOUS
Status: COMPLETED | OUTPATIENT
Start: 2020-11-16 | End: 2020-11-16

## 2020-11-16 RX ORDER — BETAMETHASONE SODIUM PHOSPHATE AND BETAMETHASONE ACETATE 3; 3 MG/ML; MG/ML
INJECTION, SUSPENSION INTRA-ARTICULAR; INTRALESIONAL; INTRAMUSCULAR; SOFT TISSUE
Status: COMPLETED | OUTPATIENT
Start: 2020-11-16 | End: 2020-11-16

## 2020-11-16 RX ORDER — BUPIVACAINE HYDROCHLORIDE 5 MG/ML
INJECTION, SOLUTION EPIDURAL; INTRACAUDAL
Status: COMPLETED | OUTPATIENT
Start: 2020-11-16 | End: 2020-11-16

## 2020-11-16 ASSESSMENT — PAIN SCALES - GENERAL
PAINLEVEL_OUTOF10: 0

## 2020-11-16 ASSESSMENT — PAIN DESCRIPTION - DESCRIPTORS: DESCRIPTORS: ACHING;THROBBING

## 2020-11-16 NOTE — H&P
HISTORY AND PHYSICAL/PRE-SEDATION ASSESSMENT    Patient:  Alberto Vazquez   :  1960  Medical Record No.:  [id number]   Date:  2020  Physician:  Ruby Queen M.D. Facility: [address] [place] [address]    HISTORY OF PRESENT ILLNESS:                 The patient is a 56 y.o. female whom presents with low back and right leg pain. Review of the imaging and physical exam of the patient confirmed the pre-procedure diagnosis. After a thorough discussion of risks, benefits and alternatives informed consent was obtained. Past Medical History:   Past Medical History:   Diagnosis Date    Hyperlipidemia     Thyroid disease       Past Surgical History:     Past Surgical History:   Procedure Laterality Date     SECTION      x2    CHOLECYSTECTOMY      CYSTOSCOPY  13    CYSTOSCOPY URETHRAL DILATATION HYDRODISTENTION OF BLADDER    KIDNEY REMOVAL Left     PAIN MANAGEMENT PROCEDURE Right 10/19/2020    RIGHT L4 AND L5 TRANSFORAMINAL EPIDURAL STEROID INJECTION WITH FLUOROSCOPY ([id number], 87779) performed by Ruby Queen MD at [address]       Current Medications:   Prior to Admission medications    Medication Sig Start Date End Date Taking? Authorizing Provider   atorvastatin (LIPITOR) 40 MG tablet Take 40 mg by mouth daily 19  Yes Historical Provider, MD   levothyroxine (SYNTHROID) 100 MCG tablet TAKE 1 TABLET (100 MCG TOTAL) BY MOUTH DAILY. 17  Yes Historical Provider, MD   ibuprofen (ADVIL;MOTRIN) 800 MG tablet Take 1 tablet by mouth every 8 hours as needed for Pain 20   Hernán Brizuela MD     Allergies:  Phenergan [promethazine hcl] and Sulfa antibiotics  Social History:    reports that she has never smoked. She has never used smokeless tobacco. She reports that she does not drink alcohol or use drugs.   Family History:   Family History   Problem Relation Age of Onset    Diabetes type 2  Mother     Cancer Father        Vitals: Blood pressure (!) 147/92, pulse 97, temperature 98 °F (36.7 °C), temperature source Temporal, resp. rate 18, height 5' 3\" (1.6 m), weight 210 lb (95.3 kg), SpO2 97 %, not currently breastfeeding. PHYSICAL EXAM:including affected areas  HENT: Airway patent and reviewed  Cardiovascular: Normal rate, regular rhythm, normal heart sounds. Pulmonary/Chest: No wheezes. No rhonchi. No rales. Abdominal: Soft. Bowel sounds are normal. No distension. Extremities: Moves all extremities equally  Cervical and Lumbar Spine: Painful range of motion, no midline tenderness       Diagnosis:Lumbar radiculopathy  M54.16  M47.816  M17.12    Plan: Proceed with planned procedure      ASA CLASS:         []   I. Normal, healthy adult           [x]   II.  Mild systemic disease            []   III. Severe systemic disease      Mallampati: Mallampati Class II - (soft palate, fauces & uvula are visible)      Sedation plan:   [x]  Local              []  Minimal                  []  General anesthesia    Patient's condition acceptable for planned procedure/sedation. Post Procedure Plan   Return to same level of care   ______________________     The patient was counseled at length about the risks of ruy Covid-19 in the beckie-operative and post-operative states including the recovery window of their procedure. The patient was made aware that ruy Covid-19 after a surgical procedure may worsen their prognosis for recovering from the virus and lend to a higher morbidity and or mortality risk. The patient was given the options of postponing their procedure. All of the risks, benefits, and alternatives were discussed. The patient does wish to proceed with the procedure. The risks and benefits as well as alternatives to the procedure have been discussed with the patient and or family. The patient and or next of kin understands and agrees to proceed.     Massiel Fang M.D.

## 2020-11-16 NOTE — PROGRESS NOTES
Teaching / education initiated regarding perioperative experience, expectations, and pain management during stay. Patient verbalized understanding.  Trang Jimenez RN

## 2020-11-16 NOTE — OP NOTE
Patient:  Mary Caldwell  YOB: 1960  Medical Record #:  [id number]   Place: [address] [place] [address]  Date:  11/16/2020   Physician:  Giuliano Painter MD, JOHN    Procedure: 1. Transforaminal Lumbar Epidural Steroid Injection -  right L4  CPT 41200          2. Transforaminal Lumbar Epidural Steroid Injection -  right L5  CPT 58440    KUMAR #2    Pre-Procedure Diagnosis: Lumbar radiculopathy      Post-Procedure Diagnosis: Same    Sedation: Local with 1% Lidocaine 3 ml and 1 mg of IV Versed and 25 mcg of Fentanyl    EBL: None    Complications: None    Procedure Summary:        The patient was brought to the procedure suite and placed in the prone position. The skin overlying the lumbar spine was prepped and draped in the usual sterile fashion. Using fluoroscopic guidance, the right L4 foramen was identified. Through anesthetized skin, a 22 gauge 5 inch curved tip spinal needle was advanced into the foramen. Isovue M 300 was instilled showing an epidurogram/nerve root outline pattern without evidence of vascular or intrathecal spread. Following which, 7.5 mg of Celestone mixed with 1 ml of 0.5% Marcaine was instilled. The needle was removed. Using fluoroscopic guidance, the right L5 foramen was identified. Through anesthetized skin, a 22 gauge 5 inch curved tip spinal needle was advanced into the foramen. Isovue M 300 was instilled showing an epidurogram/nerve root outline pattern without evidence of vascular or intrathecal spread. Following which, 7.5 mg of Celestone mixed with 1 ml of 0.5% Marcaine was instilled. The needle was removed and band-aids were applied. The patient was transferred to the post-operative area in stable condition.

## 2020-12-02 ENCOUNTER — OFFICE VISIT (OUTPATIENT)
Dept: ORTHOPEDIC SURGERY | Age: 60
End: 2020-12-02
Payer: MEDICARE

## 2020-12-02 VITALS — TEMPERATURE: 96.9 F | HEIGHT: 63 IN | BODY MASS INDEX: 37.23 KG/M2 | WEIGHT: 210.1 LBS | RESPIRATION RATE: 14 BRPM

## 2020-12-02 PROCEDURE — 99214 OFFICE O/P EST MOD 30 MIN: CPT | Performed by: STUDENT IN AN ORGANIZED HEALTH CARE EDUCATION/TRAINING PROGRAM

## 2020-12-02 NOTE — PROGRESS NOTES
Follow up: Colonel Regan  1960  [id number]      CHIEF COMPLAINT:    Chief Complaint   Patient presents with    Lower Back Pain     11/16/2020: R L4 + L5 TF    Leg Pain     continues to radiate into medial quad and mild anterior tib. HISTORY OF PRESENT ILLNESS:  Ms. [facility] is a 56 y.o. female returns for a follow up visit for multiple medical problems. Her current presenting problems are   1. Paresthesia of right lower extremity    2. Lumbar radiculitis    3. Lumbar spondylosis    . As per information/history obtained from the PADT(patient assessment and documentation tool) - She complains of pain in the lower back with radiation to the upper leg Right and lower leg Right She rates the pain 8/10 and describes it as dull, aching, throbbing. Pain is made worse by: movement, walking, standing, sitting, bending. She denies side effects from the current pain regimen. Patient reports that since the last follow up visit the physical functioning is worse, family/social relationships are unchanged, mood is unchanged and sleep patterns are unchanged, and that the overall functioning is unchanged. Patient denies neurological bowel or bladder. Ms. [facility] presents in follow-up for ongoing low back pain with radiation to the right leg. She recently underwent a right L4 and L5 transforaminal epidural steroid injection on 11/16/2020. She reports no improvement following this injection. This was her second transforaminal epidural steroid injection with no relief. She continues to have pain radiating to the right upper leg on the medial side of the quad and intermittently to the calf. The patient states everything aggravates her pain. Walking, sitting, standing for any period of time makes her pain worse. Putting her leg elevated on a pillow while sitting on the couch slightly alleviates her pain. She does also describe right leg weakness with walking and going upstairs.   She also or redness  GI: Denies nausea, vomiting, diarrhea   : Denies bowel or bladder issues       PHYSICAL EXAM:    Vitals: Temperature 96.9 °F (36.1 °C), resp. rate 14, height 5' 2.99\" (1.6 m), weight 210 lb 1.6 oz (95.3 kg), not currently breastfeeding. GENERAL EXAM:  · General Apparence: Patient is adequately groomed with no evidence of malnutrition. · Psychiatric: Orientation: The patient is oriented to time, place and person. The patient's mood and affect are appropriate   · Vascular: Examination reveals no swelling and palpation reveals no tenderness in upper or lower extremities. Good capillary refill. · The lymphatic examination of the neck, axillae and groin reveals all areas to be without enlargement or induration  · Sensation is intact without deficit in the upper and lower extremities to light touch and pinprick  · Coordination of the upper and lower extremities are normal.  · RIGHT UPPER EXTREMITY:  Inspection/examination of the right upper extremity does not show any tenderness, deformity or injury. Range of motion is unremarkable and pain-free. There is no gross instability. There are no rashes, ulcerations or lesions. Strength and tone are normal. No atrophy or abnormal movements are noted. · LEFT UPPER EXTREMITY: Inspection/examination of the left upper extremity does not show any tenderness, deformity or injury. Range of motion is unremarkable and pain-free. There is no gross instability. There are no rashes, ulcerations or lesions. Strength and tone are normal. No atrophy or abnormal movements are noted. LUMBAR/SACRAL EXAMINATION:  · Inspection: Local inspection shows no step-off or bruising. Lumbar alignment is normal. No instability is noted. · Palpation:   No evidence of tenderness at the midline. Lumbar paraspinal tenderness: Mild L4/5 and L5/S1 tenderness  Bursal tenderness No tenderness bilaterally  There is no paraspinal spasm.   · Range of Motion: limited by 25% in all planes due to pain  · Strength:   Strength testing is 5/5 in all muscle groups tested. · Special Tests:   Straight leg raise and crossed SLR negative. Ignacio's testing is negative bilaterally. FADIR's testing is negative bilaterally. · Skin: There are no rashes, ulcerations or lesions. · Reflexes: Reflexes are symmetrically 1+ at the patellar and ankle tendons. Clonus absent bilaterally at the feet. · Gait & station: antalgic on the right  · Additional Examinations:  · RIGHT LOWER EXTREMITY: Inspection/examination of the right lower extremity does not show any tenderness, deformity or injury. Range of motion is normal and pain-free. There is no gross instability. There are no rashes, ulcerations or lesions. Strength and tone are normal. No atrophy or abnormal movements are noted. · LEFT LOWER EXTREMITY:  Inspection/examination of the left lower extremity does not show any tenderness, deformity or injury. Range of motion is normal and pain-free. There is no gross instability. There are no rashes, ulcerations or lesions. Strength and tone are normal. No atrophy or abnormal movements are noted. Diagnostic Testing:    MR Lumbar spine shows  6/15/2020  L1-L2: No focal disc herniation or spinal canal stenosis.  Neural foramina are patent.  Mild    facet arthropathy.         L2-L3: Shallow disc bulge without spinal canal stenosis.  Neural foramina are patent.  Mild    facet arthropathy.         L3-L4: Shallow disc bulge without spinal canal stenosis. Neural foramina are patent. Mild facet     arthropathy.         L4-L5: Shallow disc bulge without spinal canal stenosis.  Neural foramina are patent.  Mild    facet arthropathy.         L5-S1: Shallow disc bulge without spinal canal stenosis.  Neural foramina are patent.  Mild    facet arthropathy.             Results for orders placed or performed in visit on 10/14/20   COVID-19   Result Value Ref Range    SARS-CoV-2, PCR Not Detected Not Detected     Impression:       1. Paresthesia of right lower extremity    2. Lumbar radiculitis    3. Lumbar spondylosis        Plan:  Clinical Course: Above diagnoses are worsening    I discussed the diagnosis and the treatment options with Ellie Ramires today. In Summary:  The various treatment options were outlined and discussed with Ellie Ramires including:  Conservative care options: physical therapy, ice, medications, bracing, and activity modification. The indications for therapeutic injections. The indications for additional imaging/laboratory studies. The indications for (possible future) interventions. After considering the various options discussed, Ellie Ramires elected to pursue a course of treatment that includes the followin. Medications:  No further recommendations for new medications. 2. PT:  Encouraged to continue with HEP. 3. Further studies:  Obtain EMG to evaluate for paresthesias to rule out a nerve entrapment. 4. Interventional:  After further imaging is obtained, interventional options will be reviewed and recommended. 5. Follow up: After EMG      Ellie Ramires was instructed to call the office if her symptoms worsen or if new symptoms appear prior to the next scheduled visit. She is specifically instructed to contact the office between now & her scheduled appointment if she has concerns related to her condition or if she needs assistance in scheduling the above tests. She is welcome to call for an appointment sooner if she has any additional concerns or questions. Valeriano Muhammad PA-C   Board Certified by the [employer] on Certification of Physician Assistants  [id number]  Partner of [facility] ([facility])             This dictation was performed with a verbal recognition program [facility] ) and it was checked for errors. It is possible that there are still dictated errors within this office note.  If so, please bring any errors to my attention for an addendum. All efforts were made to ensure that this office note is accurate.

## 2020-12-04 ENCOUNTER — TELEPHONE (OUTPATIENT)
Dept: ORTHOPEDIC SURGERY | Age: 60
End: 2020-12-04

## 2021-01-01 NOTE — LETTER
[facility]  [place] [address] [address]  Phone: [phone]  Fax: [phone]    Bennett Gibbs MD        November 28, 2017     Patient: Tamar Cunningham   YOB: 1960   Date of Visit: 11/28/2017       To Whom It May Concern: It is my medical opinion that Tamar Cunningham may return to work on 12/4/17. If you have any questions or concerns, please don't hesitate to call.     Sincerely,      Gustavo Glover MD.      Bennett Gibbs MD
English

## 2021-05-03 RX ORDER — IBUPROFEN 800 MG/1
800 TABLET ORAL EVERY 8 HOURS PRN
Qty: 90 TABLET | Refills: 1 | Status: SHIPPED | OUTPATIENT
Start: 2021-05-03

## 2022-03-01 ENCOUNTER — APPOINTMENT (RX ONLY)
Dept: URBAN - METROPOLITAN AREA CLINIC 170 | Facility: CLINIC | Age: 62
Setting detail: DERMATOLOGY
End: 2022-03-01

## 2022-03-01 DIAGNOSIS — L85.3 XEROSIS CUTIS: ICD-10-CM | Status: INADEQUATELY CONTROLLED

## 2022-03-01 DIAGNOSIS — L71.8 OTHER ROSACEA: ICD-10-CM | Status: INADEQUATELY CONTROLLED

## 2022-03-01 PROCEDURE — ? PRESCRIPTION MEDICATION MANAGEMENT

## 2022-03-01 PROCEDURE — ? COUNSELING

## 2022-03-01 PROCEDURE — ? FULL BODY SKIN EXAM - DECLINED

## 2022-03-01 PROCEDURE — ? PRESCRIPTION

## 2022-03-01 PROCEDURE — ? ADDITIONAL NOTES

## 2022-03-01 PROCEDURE — 99204 OFFICE O/P NEW MOD 45 MIN: CPT

## 2022-03-01 RX ORDER — METRONIDAZOLE 7.5 MG/G
CREAM TOPICAL
Qty: 45 | Refills: 2 | Status: ERX | COMMUNITY
Start: 2022-03-01

## 2022-03-01 RX ADMIN — METRONIDAZOLE 1: 7.5 CREAM TOPICAL at 00:00

## 2022-03-01 ASSESSMENT — LOCATION SIMPLE DESCRIPTION DERM
LOCATION SIMPLE: LEFT CHEEK
LOCATION SIMPLE: LEFT FOREHEAD
LOCATION SIMPLE: NOSE
LOCATION SIMPLE: RIGHT HAND
LOCATION SIMPLE: RIGHT CHEEK
LOCATION SIMPLE: LEFT HAND

## 2022-03-01 ASSESSMENT — LOCATION ZONE DERM
LOCATION ZONE: HAND
LOCATION ZONE: FACE
LOCATION ZONE: NOSE

## 2022-03-01 ASSESSMENT — LOCATION DETAILED DESCRIPTION DERM
LOCATION DETAILED: RIGHT INFERIOR CENTRAL MALAR CHEEK
LOCATION DETAILED: NASAL SUPRATIP
LOCATION DETAILED: LEFT SUPERIOR MEDIAL FOREHEAD
LOCATION DETAILED: RIGHT ULNAR DORSAL HAND
LOCATION DETAILED: LEFT RADIAL DORSAL HAND
LOCATION DETAILED: LEFT INFERIOR CENTRAL MALAR CHEEK

## 2022-03-01 NOTE — HPI: RASH
Additional History: Pt complains of her face being red and dry. She states that it started a few months ago. She has only used lotion for treatment. Pt also complains of dr6 skin.

## 2022-03-01 NOTE — PROCEDURE: PRESCRIPTION MEDICATION MANAGEMENT
Initiate Treatment: Metronidazole bid
Render In Strict Bullet Format?: No
Detail Level: Detailed
Samples Given: Cerave am moisturizer, Eucerin advanced repair, Aveeno eczema therapy

## 2023-04-12 ENCOUNTER — APPOINTMENT (RX ONLY)
Dept: URBAN - METROPOLITAN AREA CLINIC 170 | Facility: CLINIC | Age: 63
Setting detail: DERMATOLOGY
End: 2023-04-12

## 2023-04-12 DIAGNOSIS — L81.4 OTHER MELANIN HYPERPIGMENTATION: ICD-10-CM | Status: STABLE

## 2023-04-12 DIAGNOSIS — L71.8 OTHER ROSACEA: ICD-10-CM | Status: INADEQUATELY CONTROLLED

## 2023-04-12 DIAGNOSIS — L82.1 OTHER SEBORRHEIC KERATOSIS: ICD-10-CM | Status: STABLE

## 2023-04-12 DIAGNOSIS — D22 MELANOCYTIC NEVI: ICD-10-CM | Status: STABLE

## 2023-04-12 DIAGNOSIS — D18.0 HEMANGIOMA: ICD-10-CM | Status: STABLE

## 2023-04-12 PROBLEM — D18.01 HEMANGIOMA OF SKIN AND SUBCUTANEOUS TISSUE: Status: ACTIVE | Noted: 2023-04-12

## 2023-04-12 PROBLEM — D22.5 MELANOCYTIC NEVI OF TRUNK: Status: ACTIVE | Noted: 2023-04-12

## 2023-04-12 PROCEDURE — ? COUNSELING

## 2023-04-12 PROCEDURE — 99214 OFFICE O/P EST MOD 30 MIN: CPT

## 2023-04-12 PROCEDURE — ? FULL BODY SKIN EXAM

## 2023-04-12 PROCEDURE — ? PRESCRIPTION

## 2023-04-12 PROCEDURE — ? SUNSCREEN RECOMMENDATIONS

## 2023-04-12 PROCEDURE — ? PRESCRIPTION MEDICATION MANAGEMENT

## 2023-04-12 PROCEDURE — ? TREATMENT REGIMEN

## 2023-04-12 PROCEDURE — ? ADDITIONAL NOTES

## 2023-04-12 RX ORDER — METRONIDAZOLE 7.5 MG/G
CREAM TOPICAL
Qty: 45 | Refills: 2 | Status: ERX

## 2023-04-12 ASSESSMENT — LOCATION DETAILED DESCRIPTION DERM
LOCATION DETAILED: INFERIOR THORACIC SPINE
LOCATION DETAILED: RIGHT INFERIOR CENTRAL MALAR CHEEK
LOCATION DETAILED: NASAL SUPRATIP
LOCATION DETAILED: EPIGASTRIC SKIN
LOCATION DETAILED: RIGHT ANTERIOR SHOULDER
LOCATION DETAILED: LEFT INFERIOR CENTRAL MALAR CHEEK
LOCATION DETAILED: RIGHT INFERIOR MEDIAL MIDBACK

## 2023-04-12 ASSESSMENT — LOCATION ZONE DERM
LOCATION ZONE: TRUNK
LOCATION ZONE: NOSE
LOCATION ZONE: FACE
LOCATION ZONE: ARM

## 2023-04-12 ASSESSMENT — LOCATION SIMPLE DESCRIPTION DERM
LOCATION SIMPLE: RIGHT CHEEK
LOCATION SIMPLE: UPPER BACK
LOCATION SIMPLE: NOSE
LOCATION SIMPLE: RIGHT SHOULDER
LOCATION SIMPLE: ABDOMEN
LOCATION SIMPLE: LEFT CHEEK
LOCATION SIMPLE: RIGHT LOWER BACK

## 2023-04-12 ASSESSMENT — SEVERITY ASSESSMENT OVERALL AMONG ALL PATIENTS
IN YOUR EXPERIENCE, AMONG ALL PATIENTS YOU HAVE SEEN WITH THIS CONDITION, HOW SEVERE IS THIS PATIENT'S CONDITION?: MODERATE

## 2023-04-12 NOTE — PROCEDURE: PRESCRIPTION MEDICATION MANAGEMENT
Render In Strict Bullet Format?: No
Detail Level: Detailed
Modify Regimen: Increase Metro cream to BID

## 2023-04-12 NOTE — HPI: EVALUATION OF SKIN LESION(S)
What Type Of Note Output Would You Prefer (Optional)?: Bullet Format
Hpi Title: Evaluation of Skin Lesions
How Severe Are Your Spot(S)?: mild
Have Your Spot(S) Been Treated In The Past?: has not been treated
Additional History: Has a spot of concern under the right breast.

## 2023-08-16 SDOH — HEALTH STABILITY: PHYSICAL HEALTH: ON AVERAGE, HOW MANY MINUTES DO YOU ENGAGE IN EXERCISE AT THIS LEVEL?: 0 MIN

## 2023-08-16 SDOH — HEALTH STABILITY: PHYSICAL HEALTH: ON AVERAGE, HOW MANY DAYS PER WEEK DO YOU ENGAGE IN MODERATE TO STRENUOUS EXERCISE (LIKE A BRISK WALK)?: 0 DAYS

## 2023-08-17 ENCOUNTER — OFFICE VISIT (OUTPATIENT)
Dept: ORTHOPEDIC SURGERY | Age: 63
End: 2023-08-17

## 2023-08-17 VITALS — BODY MASS INDEX: 38.83 KG/M2 | WEIGHT: 211 LBS | HEIGHT: 62 IN

## 2023-08-17 DIAGNOSIS — M25.562 ACUTE PAIN OF LEFT KNEE: ICD-10-CM

## 2023-08-17 DIAGNOSIS — M17.12 PRIMARY OSTEOARTHRITIS OF LEFT KNEE: Primary | ICD-10-CM

## 2023-08-17 RX ORDER — TRIAMCINOLONE ACETONIDE 40 MG/ML
40 INJECTION, SUSPENSION INTRA-ARTICULAR; INTRAMUSCULAR ONCE
Status: COMPLETED | OUTPATIENT
Start: 2023-08-17 | End: 2023-08-17

## 2023-08-17 RX ORDER — BUPIVACAINE HYDROCHLORIDE 2.5 MG/ML
4 INJECTION, SOLUTION INFILTRATION; PERINEURAL ONCE
Status: COMPLETED | OUTPATIENT
Start: 2023-08-17 | End: 2023-08-17

## 2023-08-17 RX ORDER — MELOXICAM 15 MG/1
15 TABLET ORAL DAILY
Qty: 30 TABLET | Refills: 0 | Status: SHIPPED | OUTPATIENT
Start: 2023-08-17 | End: 2023-09-16

## 2023-08-17 RX ORDER — LIDOCAINE HYDROCHLORIDE 10 MG/ML
4 INJECTION, SOLUTION INFILTRATION; PERINEURAL ONCE
Status: COMPLETED | OUTPATIENT
Start: 2023-08-17 | End: 2023-08-17

## 2023-08-17 RX ADMIN — LIDOCAINE HYDROCHLORIDE 4 ML: 10 INJECTION, SOLUTION INFILTRATION; PERINEURAL at 08:30

## 2023-08-17 RX ADMIN — TRIAMCINOLONE ACETONIDE 40 MG: 40 INJECTION, SUSPENSION INTRA-ARTICULAR; INTRAMUSCULAR at 08:30

## 2023-08-17 RX ADMIN — BUPIVACAINE HYDROCHLORIDE 10 MG: 2.5 INJECTION, SOLUTION INFILTRATION; PERINEURAL at 08:30

## 2023-08-17 NOTE — PROGRESS NOTES
CHIEF COMPLAINT: Left knee pain. History:   Marlene Osborn is a 70 y.o. female self-referred for evaluation and treatment of left knee pain / injury. The patient complains of left knee pain. This is evaluated as a personal injury. The pain began 3 years ago. She used to see Dr. Benjamin Kay for knee osteoarthritis. She had cortisone injection which did not provide relief. She then had a Durolane injection which provided 3 years of relief. Rate pain 7/10. There was not a history of injury. Her knee started bothering her again two weeks ago. The pain is located medial joint line and posterior. It is painful with weight bearing activities. The knee has not given out or felt unstable. The patient can bend and straighten the knee fully. There is no swelling in the knee. There was not catching / locking of the knee. The patient has not had PT. The patient has had an injection. The patient has taken NSAIDs. The patient has tried ice. Outside reports reviewed: office notes.     Past Medical History:   Diagnosis Date    Hyperlipidemia     Thyroid disease        Past Surgical History:   Procedure Laterality Date     SECTION      x2    CHOLECYSTECTOMY      CYSTOSCOPY  11    CYSTOSCOPY URETHRAL DILATATION HYDRODISTENTION OF BLADDER    KIDNEY REMOVAL Left     PAIN MANAGEMENT PROCEDURE Right 10/19/2020    RIGHT L4 AND L5 TRANSFORAMINAL EPIDURAL STEROID INJECTION WITH FLUOROSCOPY (78956, [id number]) performed by Joann Godoy MD at [address] [place] [address] Right 2020    RIGHT L4 AND L5 TRANSFORAMINAL EPIDURAL STEROID INJECTION WITH FLUOROSCOPY (95669, [id number]) performed by Jonan Godoy MD at [address] [place] [address]         Family History   Problem Relation Age of Onset    Diabetes type 2  Mother     Cancer Father        Social History     Socioeconomic History    Marital status:    Occupational History    Occupation: NA   Tobacco Use    Smoking status: Never    Smokeless tobacco:

## 2023-08-23 ENCOUNTER — TELEPHONE (OUTPATIENT)
Dept: ORTHOPEDIC SURGERY | Age: 63
End: 2023-08-23

## 2023-08-23 NOTE — TELEPHONE ENCOUNTER
L/M FOR Sherrell Postne - LEFT Knee -  has been approved by insurance with the following details:   Expires 12/31/2023  She may drive herself to and from the appointment, activities as tolerated afterwards, may return to work the same day, and no additional prep for injection/appointment. She may contact the office to schedule. *This is a single injection, not a series.

## 2023-09-26 ENCOUNTER — OFFICE VISIT (OUTPATIENT)
Dept: ORTHOPEDIC SURGERY | Age: 63
End: 2023-09-26
Payer: MEDICARE

## 2023-09-26 VITALS — BODY MASS INDEX: 38.83 KG/M2 | RESPIRATION RATE: 16 BRPM | HEIGHT: 62 IN | WEIGHT: 211 LBS

## 2023-09-26 DIAGNOSIS — M17.12 PRIMARY OSTEOARTHRITIS OF LEFT KNEE: Primary | ICD-10-CM

## 2023-09-26 PROCEDURE — 20610 DRAIN/INJ JOINT/BURSA W/O US: CPT | Performed by: STUDENT IN AN ORGANIZED HEALTH CARE EDUCATION/TRAINING PROGRAM

## 2023-09-26 PROCEDURE — 99999 PR OFFICE/OUTPT VISIT,PROCEDURE ONLY: CPT | Performed by: STUDENT IN AN ORGANIZED HEALTH CARE EDUCATION/TRAINING PROGRAM

## 2023-09-26 RX ORDER — MELOXICAM 15 MG/1
15 TABLET ORAL DAILY
Qty: 30 TABLET | Refills: 2 | Status: SHIPPED | OUTPATIENT
Start: 2023-09-26 | End: 2023-12-25

## 2023-09-26 NOTE — PROGRESS NOTES
PROCEDURE NOTE:    PRE-PROCEDURE DIAGNOSIS: Left knee osteoarthritis     POST-PROCEDURE DIAGNOSIS: Left knee osteoarthritis       PROCEDURE:  The risks and benefits of an injection were discussed with the patient. The patient had full opportunity to ask questions and all were answered. The patient then provided verbal informed consent. The skin was then prepped with betadine solution and alcohol. Under aseptic conditions, the  left knee was injected with a prefilled syringe of  Durolane. There were no immediate complications following the injection. The patient was advised of the possibility of injection site reaction and instructed to apply ice to the area and take NSAIDs if able. POST-PROCEDURE INSTRUCTIONS GIVEN TO PATIENT: The patient was advised to ice the knee for 15-20 minutes to relieve any injection site related pain. FOLLOW-UP: We will no longer be in network. Given information for a provider at a different facility.  I will refill her meloxicam.             CHYNA Ngo, PA-C  Board Certified by the [employer] on Certification of Shai Najera and [address] [place] [address],[address]

## 2024-01-18 ENCOUNTER — OFFICE VISIT (OUTPATIENT)
Dept: ORTHOPEDIC SURGERY | Age: 64
End: 2024-01-18
Payer: MEDICARE

## 2024-01-18 VITALS — BODY MASS INDEX: 39.38 KG/M2 | WEIGHT: 214 LBS | HEIGHT: 62 IN | RESPIRATION RATE: 16 BRPM

## 2024-01-18 DIAGNOSIS — M17.12 PRIMARY OSTEOARTHRITIS OF LEFT KNEE: Primary | ICD-10-CM

## 2024-01-18 PROCEDURE — 20610 DRAIN/INJ JOINT/BURSA W/O US: CPT | Performed by: STUDENT IN AN ORGANIZED HEALTH CARE EDUCATION/TRAINING PROGRAM

## 2024-01-18 PROCEDURE — 99999 PR OFFICE/OUTPT VISIT,PROCEDURE ONLY: CPT | Performed by: STUDENT IN AN ORGANIZED HEALTH CARE EDUCATION/TRAINING PROGRAM

## 2024-01-18 RX ORDER — TRIAMCINOLONE ACETONIDE 40 MG/ML
40 INJECTION, SUSPENSION INTRA-ARTICULAR; INTRAMUSCULAR ONCE
Status: COMPLETED | OUTPATIENT
Start: 2024-01-18 | End: 2024-01-18

## 2024-01-18 RX ORDER — BUPIVACAINE HYDROCHLORIDE 2.5 MG/ML
4 INJECTION, SOLUTION INFILTRATION; PERINEURAL ONCE
Status: COMPLETED | OUTPATIENT
Start: 2024-01-18 | End: 2024-01-18

## 2024-01-18 RX ORDER — LIDOCAINE HYDROCHLORIDE 10 MG/ML
4 INJECTION, SOLUTION INFILTRATION; PERINEURAL ONCE
Status: COMPLETED | OUTPATIENT
Start: 2024-01-18 | End: 2024-01-18

## 2024-01-18 RX ADMIN — TRIAMCINOLONE ACETONIDE 40 MG: 40 INJECTION, SUSPENSION INTRA-ARTICULAR; INTRAMUSCULAR at 13:29

## 2024-01-18 RX ADMIN — BUPIVACAINE HYDROCHLORIDE 10 MG: 2.5 INJECTION, SOLUTION INFILTRATION; PERINEURAL at 13:28

## 2024-01-18 RX ADMIN — LIDOCAINE HYDROCHLORIDE 4 ML: 10 INJECTION, SOLUTION INFILTRATION; PERINEURAL at 13:28

## 2024-01-18 NOTE — PROGRESS NOTES
PROCEDURE NOTE:    PRE-PROCEDURE DIAGNOSIS: Left knee osteoarthritis     POST-PROCEDURE DIAGNOSIS: Left knee osteoarthritis       PROCEDURE:  The risks and benefits of an injection were discussed with the patient.  The patient had full opportunity to ask questions and all were answered.  The patient then provided verbal informed consent.  The skin was then prepped with betadine solution and alcohol.  Under aseptic conditions, the  left knee was injected with 4cc of 1% xylocaine, 4cc of 0.25% marcaine, and 1cc of Kenalog (40mg/ml).  There were no immediate complications following the injection.  The patient was advised of the possibility of injection site reaction and instructed to apply ice to the area and take NSAIDs if able.      Her durolane injection on 9/26/23 lasted 3 months.      POST-PROCEDURE INSTRUCTIONS GIVEN TO PATIENT: The patient was advised to ice the knee for 15-20 minutes to relieve any injection site related pain.     FOLLOW-UP: 3 months as needed. We will request repeat Durolane injection from her insurance.             CHYNA Hernandez, PA-C  Board Certified by the National Committee on Certification of Physician Assistants  [facility]

## 2024-03-14 ENCOUNTER — TELEPHONE (OUTPATIENT)
Dept: ORTHOPEDIC SURGERY | Age: 64
End: 2024-03-14

## 2024-03-14 DIAGNOSIS — M17.12 PRIMARY OSTEOARTHRITIS OF LEFT KNEE: Primary | ICD-10-CM

## 2024-03-14 RX ORDER — MELOXICAM 7.5 MG/1
7.5 TABLET ORAL DAILY
Qty: 30 TABLET | Refills: 2 | Status: SHIPPED | OUTPATIENT
Start: 2024-03-14

## 2024-03-14 NOTE — TELEPHONE ENCOUNTER
Spoke with patient. She declined scheduling Visco sooner than next month. She is scheduled for 4/11.    She then asked if it would be possible to get a refill of the Meloxicam. Explained that it may be Monday before request is viewed. Patient understood.

## 2024-04-11 ENCOUNTER — OFFICE VISIT (OUTPATIENT)
Dept: ORTHOPEDIC SURGERY | Age: 64
End: 2024-04-11
Payer: MEDICARE

## 2024-04-11 VITALS — WEIGHT: 202 LBS | RESPIRATION RATE: 16 BRPM | HEIGHT: 62 IN | BODY MASS INDEX: 37.17 KG/M2

## 2024-04-11 DIAGNOSIS — M17.12 PRIMARY OSTEOARTHRITIS OF LEFT KNEE: Primary | ICD-10-CM

## 2024-04-11 PROCEDURE — 20610 DRAIN/INJ JOINT/BURSA W/O US: CPT | Performed by: STUDENT IN AN ORGANIZED HEALTH CARE EDUCATION/TRAINING PROGRAM

## 2024-04-11 RX ORDER — PHENTERMINE HYDROCHLORIDE 37.5 MG/1
37.5 TABLET ORAL
COMMUNITY
Start: 2024-03-15 | End: 2024-04-14

## 2024-04-11 NOTE — PROGRESS NOTES
PROCEDURE NOTE:    PRE-PROCEDURE DIAGNOSIS: Left knee osteoarthritis     POST-PROCEDURE DIAGNOSIS: Left knee osteoarthritis       PROCEDURE:  The risks and benefits of an injection were discussed with the patient.  The patient had full opportunity to ask questions and all were answered.  The patient then provided verbal informed consent.  The skin was then prepped with betadine solution and alcohol.  Under aseptic conditions, the  left knee was injected with a prefilled syringe of durolane. There were no immediate complications following the injection.  The patient was advised of the possibility of injection site reaction and instructed to apply ice to the area and take NSAIDs if able.      Her durolane injection on 9/26/23 lasted 3 months and brought pain from 5/10 to 2/10.      POST-PROCEDURE INSTRUCTIONS GIVEN TO PATIENT: The patient was advised to ice the knee for 15-20 minutes to relieve any injection site related pain.     FOLLOW-UP: 3 months as needed for cortisone.             CHYNA Hernandez, PA-C  Board Certified by the National Committee on Certification of Physician Assistants  [facility]

## 2024-04-15 ENCOUNTER — APPOINTMENT (RX ONLY)
Dept: URBAN - METROPOLITAN AREA CLINIC 170 | Facility: CLINIC | Age: 64
Setting detail: DERMATOLOGY
End: 2024-04-15

## 2024-04-15 DIAGNOSIS — Z80.8 FAMILY HISTORY OF MALIGNANT NEOPLASM OF OTHER ORGANS OR SYSTEMS: ICD-10-CM | Status: STABLE

## 2024-04-15 DIAGNOSIS — L82.1 OTHER SEBORRHEIC KERATOSIS: ICD-10-CM | Status: STABLE

## 2024-04-15 DIAGNOSIS — D22 MELANOCYTIC NEVI: ICD-10-CM | Status: STABLE

## 2024-04-15 DIAGNOSIS — L71.8 OTHER ROSACEA: ICD-10-CM | Status: INADEQUATELY CONTROLLED

## 2024-04-15 DIAGNOSIS — D18.0 HEMANGIOMA: ICD-10-CM | Status: STABLE

## 2024-04-15 DIAGNOSIS — L81.4 OTHER MELANIN HYPERPIGMENTATION: ICD-10-CM | Status: STABLE

## 2024-04-15 PROBLEM — D18.01 HEMANGIOMA OF SKIN AND SUBCUTANEOUS TISSUE: Status: ACTIVE | Noted: 2024-04-15

## 2024-04-15 PROBLEM — D22.5 MELANOCYTIC NEVI OF TRUNK: Status: ACTIVE | Noted: 2024-04-15

## 2024-04-15 PROCEDURE — ? MDM - TREATMENT GOALS

## 2024-04-15 PROCEDURE — ? COUNSELING

## 2024-04-15 PROCEDURE — ? TREATMENT REGIMEN

## 2024-04-15 PROCEDURE — ? FULL BODY SKIN EXAM

## 2024-04-15 PROCEDURE — 99214 OFFICE O/P EST MOD 30 MIN: CPT

## 2024-04-15 PROCEDURE — ? PRESCRIPTION MEDICATION MANAGEMENT

## 2024-04-15 PROCEDURE — ? PRESCRIPTION

## 2024-04-15 RX ORDER — DOXYCYCLINE HYCLATE 20 MG
TABLET ORAL
Qty: 180 | Refills: 1 | Status: ERX | COMMUNITY
Start: 2024-04-15

## 2024-04-15 RX ADMIN — Medication 1: at 00:00

## 2024-04-15 ASSESSMENT — LOCATION SIMPLE DESCRIPTION DERM
LOCATION SIMPLE: LEFT CHEEK
LOCATION SIMPLE: NOSE
LOCATION SIMPLE: RIGHT CHEEK
LOCATION SIMPLE: ABDOMEN
LOCATION SIMPLE: RIGHT UPPER BACK
LOCATION SIMPLE: UPPER BACK
LOCATION SIMPLE: RIGHT LOWER BACK

## 2024-04-15 ASSESSMENT — LOCATION DETAILED DESCRIPTION DERM
LOCATION DETAILED: LEFT INFERIOR CENTRAL MALAR CHEEK
LOCATION DETAILED: RIGHT INFERIOR CENTRAL MALAR CHEEK
LOCATION DETAILED: INFERIOR THORACIC SPINE
LOCATION DETAILED: NASAL SUPRATIP
LOCATION DETAILED: RIGHT SUPERIOR UPPER BACK
LOCATION DETAILED: EPIGASTRIC SKIN
LOCATION DETAILED: RIGHT INFERIOR MEDIAL MIDBACK

## 2024-04-15 ASSESSMENT — LOCATION ZONE DERM
LOCATION ZONE: TRUNK
LOCATION ZONE: NOSE
LOCATION ZONE: FACE

## 2024-04-15 NOTE — PROCEDURE: COUNSELING
Detail Level: Generalized
Sunscreen Recommendations: SPF 50
Detail Level: Detailed
Detail Level: Simple

## 2024-04-15 NOTE — PROCEDURE: PRESCRIPTION MEDICATION MANAGEMENT
Initiate Treatment: Doxycycline 20mg bid
Render In Strict Bullet Format?: No
Plan to check BUN/Creat, CBC, Hepatic panel at f/u appt. Patient will call pharmacy for metronidazole refills when needed.
Continue Regimen: Metronidazole 0.75% cream bid
Detail Level: Detailed

## 2024-07-18 ENCOUNTER — OFFICE VISIT (OUTPATIENT)
Dept: ORTHOPEDIC SURGERY | Age: 64
End: 2024-07-18
Payer: MEDICARE

## 2024-07-18 VITALS — BODY MASS INDEX: 36.99 KG/M2 | WEIGHT: 201 LBS | HEIGHT: 62 IN | RESPIRATION RATE: 16 BRPM

## 2024-07-18 DIAGNOSIS — M17.12 PRIMARY OSTEOARTHRITIS OF LEFT KNEE: Primary | ICD-10-CM

## 2024-07-18 PROCEDURE — 20610 DRAIN/INJ JOINT/BURSA W/O US: CPT | Performed by: STUDENT IN AN ORGANIZED HEALTH CARE EDUCATION/TRAINING PROGRAM

## 2024-07-18 RX ORDER — TRIAMCINOLONE ACETONIDE 40 MG/ML
40 INJECTION, SUSPENSION INTRA-ARTICULAR; INTRAMUSCULAR ONCE
Status: COMPLETED | OUTPATIENT
Start: 2024-07-18 | End: 2024-07-18

## 2024-07-18 RX ORDER — METOPROLOL SUCCINATE 25 MG/1
25 TABLET, EXTENDED RELEASE ORAL DAILY
COMMUNITY
Start: 2024-06-20

## 2024-07-18 RX ORDER — BUPIVACAINE HYDROCHLORIDE 2.5 MG/ML
4 INJECTION, SOLUTION INFILTRATION; PERINEURAL ONCE
Status: COMPLETED | OUTPATIENT
Start: 2024-07-18 | End: 2024-07-18

## 2024-07-18 RX ORDER — LIDOCAINE HYDROCHLORIDE 10 MG/ML
4 INJECTION, SOLUTION INFILTRATION; PERINEURAL ONCE
Status: COMPLETED | OUTPATIENT
Start: 2024-07-18 | End: 2024-07-18

## 2024-07-18 RX ADMIN — TRIAMCINOLONE ACETONIDE 40 MG: 40 INJECTION, SUSPENSION INTRA-ARTICULAR; INTRAMUSCULAR at 14:51

## 2024-07-18 RX ADMIN — BUPIVACAINE HYDROCHLORIDE 10 MG: 2.5 INJECTION, SOLUTION INFILTRATION; PERINEURAL at 14:50

## 2024-07-18 RX ADMIN — LIDOCAINE HYDROCHLORIDE 4 ML: 10 INJECTION, SOLUTION INFILTRATION; PERINEURAL at 14:50

## 2024-07-18 NOTE — PROGRESS NOTES
PROCEDURE NOTE:    PRE-PROCEDURE DIAGNOSIS: Left knee osteoarthritis     POST-PROCEDURE DIAGNOSIS: Left knee osteoarthritis       PROCEDURE:  The risks and benefits of an injection were discussed with the patient.  The patient had full opportunity to ask questions and all were answered.  The patient then provided verbal informed consent.  The skin was then prepped with betadine solution and alcohol.  Under aseptic conditions, the  left knee was injected with 4cc of 1% xylocaine, 4cc of 0.25% marcaine, and 1cc of Kenalog (40mg/ml).  There were no immediate complications following the injection.  The patient was advised of the possibility of injection site reaction and instructed to apply ice to the area and take NSAIDs if able.        Her durolane injection on 4/11/24 lasted 2-3 months and brought pain from 5/10 to 2/10.      POST-PROCEDURE INSTRUCTIONS GIVEN TO PATIENT: The patient was advised to ice the knee for 15-20 minutes to relieve any injection site related pain.     FOLLOW-UP: 3 months as needed for cortisone and October for durolane. Will request from her insurance.              CHYNA Hernandez, PA-C  Board Certified by the National Committee on Certification of Physician Assistants  [facility]

## 2024-08-12 DIAGNOSIS — M17.12 PRIMARY OSTEOARTHRITIS OF LEFT KNEE: ICD-10-CM

## 2024-08-12 RX ORDER — MELOXICAM 7.5 MG/1
7.5 TABLET ORAL DAILY
Qty: 30 TABLET | Refills: 2 | Status: SHIPPED | OUTPATIENT
Start: 2024-08-12

## 2024-08-21 ENCOUNTER — APPOINTMENT (RX ONLY)
Dept: URBAN - METROPOLITAN AREA CLINIC 170 | Facility: CLINIC | Age: 64
Setting detail: DERMATOLOGY
End: 2024-08-21

## 2024-08-21 DIAGNOSIS — D69.2 OTHER NONTHROMBOCYTOPENIC PURPURA: ICD-10-CM | Status: STABLE

## 2024-08-21 DIAGNOSIS — L71.8 OTHER ROSACEA: ICD-10-CM | Status: INADEQUATELY CONTROLLED

## 2024-08-21 DIAGNOSIS — L21.8 OTHER SEBORRHEIC DERMATITIS: ICD-10-CM | Status: INADEQUATELY CONTROLLED

## 2024-08-21 PROCEDURE — ? PRESCRIPTION

## 2024-08-21 PROCEDURE — ? FULL BODY SKIN EXAM - DECLINED

## 2024-08-21 PROCEDURE — 99214 OFFICE O/P EST MOD 30 MIN: CPT

## 2024-08-21 PROCEDURE — ? COUNSELING

## 2024-08-21 PROCEDURE — ? PRESCRIPTION MEDICATION MANAGEMENT

## 2024-08-21 PROCEDURE — ? MDM - TREATMENT GOALS

## 2024-08-21 RX ORDER — KETOCONAZOLE 20 MG/ML
SHAMPOO, SUSPENSION TOPICAL
Qty: 120 | Refills: 2 | Status: ERX | COMMUNITY
Start: 2024-08-21

## 2024-08-21 RX ADMIN — KETOCONAZOLE 1: 20 SHAMPOO, SUSPENSION TOPICAL at 00:00

## 2024-08-21 ASSESSMENT — SEVERITY ASSESSMENT OVERALL AMONG ALL PATIENTS
IN YOUR EXPERIENCE, AMONG ALL PATIENTS YOU HAVE SEEN WITH THIS CONDITION, HOW SEVERE IS THIS PATIENT'S CONDITION?: MODERATE TO SEVERE

## 2024-08-21 ASSESSMENT — LOCATION ZONE DERM
LOCATION ZONE: SCALP
LOCATION ZONE: NOSE
LOCATION ZONE: ARM
LOCATION ZONE: FACE

## 2024-08-21 ASSESSMENT — LOCATION DETAILED DESCRIPTION DERM
LOCATION DETAILED: LEFT DISTAL DORSAL FOREARM
LOCATION DETAILED: NASAL SUPRATIP
LOCATION DETAILED: RIGHT INFERIOR CENTRAL MALAR CHEEK
LOCATION DETAILED: LEFT SUPERIOR PARIETAL SCALP
LOCATION DETAILED: LEFT INFERIOR CENTRAL MALAR CHEEK

## 2024-08-21 ASSESSMENT — LOCATION SIMPLE DESCRIPTION DERM
LOCATION SIMPLE: RIGHT CHEEK
LOCATION SIMPLE: LEFT CHEEK
LOCATION SIMPLE: NOSE
LOCATION SIMPLE: SCALP
LOCATION SIMPLE: LEFT FOREARM

## 2024-08-21 NOTE — PROCEDURE: MIPS QUALITY
Quality 226: Preventive Care And Screening: Tobacco Use: Screening And Cessation Intervention: Patient screened for tobacco use and is an ex/non-smoker
PACU/Transfer Note
Quality 47: Advance Care Plan: Advance Care Planning discussed and documented; advance care plan or surrogate decision maker documented in the medical record.
Quality 130: Documentation Of Current Medications In The Medical Record: Current Medications Documented
Quality 394a: Meningococcal Immunizations For Adolescents: Patient had one dose of meningococcal vaccine (serogroups A, C, W, Y) on or between the patient's 11th and 13th birthdays.
Detail Level: Detailed
Quality 431: Preventive Care And Screening: Unhealthy Alcohol Use - Screening: Patient not identified as an unhealthy alcohol user when screened for unhealthy alcohol use using a systematic screening method

## 2024-08-21 NOTE — PROCEDURE: PRESCRIPTION MEDICATION MANAGEMENT
Initiate Treatment: Pt filled RX but never started. Reinforced teaching: Doxycycline 20mg bid
Render In Strict Bullet Format?: No
Plan to check BUN/Creat, CBC, Hepatic panel at f/u appt if pt starts Doxy. Patient will call pharmacy for metronidazole refills when needed.
Continue Regimen: Metronidazole 0.75% cream bid
Detail Level: Detailed

## 2024-09-04 ENCOUNTER — TELEPHONE (OUTPATIENT)
Dept: ORTHOPEDIC SURGERY | Age: 64
End: 2024-09-04

## 2024-09-04 NOTE — TELEPHONE ENCOUNTER
PATIENT CALLED IN TO HAVE ANOTHER DUROLANE INJECTION OF THE LT KNEE    SHE WOULD LIKE TO BEGIN THE APPROVAL PROCESS SO THAT BY THE TIME SHE IS ELIGIBLE     [phone] IS WHERE PATIENT CAN BE REACHED

## 2024-09-05 NOTE — TELEPHONE ENCOUNTER
Spoke with patient. Explained that I cannot request her durolane until the beginning of October since she is not due until mid-October. I have a reminder set for the order.    Patient then asked about another cortisone. Explained that she just had one on 7/18 and it is too soon for that as well.    Patient asked what she could do in the meantime. Patient is advised to try ice and voltaren gel.

## 2024-09-26 DIAGNOSIS — M17.12 PRIMARY OSTEOARTHRITIS OF LEFT KNEE: Primary | ICD-10-CM

## 2024-09-30 ENCOUNTER — TELEPHONE (OUTPATIENT)
Dept: ORTHOPEDIC SURGERY | Age: 64
End: 2024-09-30

## 2024-09-30 NOTE — TELEPHONE ENCOUNTER
Authorization for Carlos Alberto (series of 1)  left knee valid from 10/11/2024 - 4/9/2025.    LM for patient that I would send [employer] message with details.

## 2024-10-15 ENCOUNTER — OFFICE VISIT (OUTPATIENT)
Dept: ORTHOPEDIC SURGERY | Age: 64
End: 2024-10-15
Payer: MEDICARE

## 2024-10-15 VITALS — WEIGHT: 208 LBS | HEIGHT: 62 IN | BODY MASS INDEX: 38.28 KG/M2

## 2024-10-15 DIAGNOSIS — M17.12 PRIMARY OSTEOARTHRITIS OF LEFT KNEE: Primary | ICD-10-CM

## 2024-10-15 PROCEDURE — 20610 DRAIN/INJ JOINT/BURSA W/O US: CPT | Performed by: STUDENT IN AN ORGANIZED HEALTH CARE EDUCATION/TRAINING PROGRAM

## 2024-10-15 NOTE — PROGRESS NOTES
PROCEDURE NOTE:    PRE-PROCEDURE DIAGNOSIS: Left knee osteoarthritis     POST-PROCEDURE DIAGNOSIS: Left knee osteoarthritis       PROCEDURE:  The risks and benefits of an injection were discussed with the patient.  The patient had full opportunity to ask questions and all were answered.  The patient then provided verbal informed consent.  The skin was then prepped with betadine solution and alcohol.  Under aseptic conditions, the  left knee was injected with a prefilled syringe of Durolane. There were no immediate complications following the injection.  The patient was advised of the possibility of injection site reaction and instructed to apply ice to the area and take NSAIDs if able.      Her durolane injection on 4/11/24 lasted 2-3 months and brought pain from 5/10 to 2/10.      POST-PROCEDURE INSTRUCTIONS GIVEN TO PATIENT: The patient was advised to ice the knee for 15-20 minutes to relieve any injection site related pain.     FOLLOW-UP: next week for CHYNA Vaughan PA-C  Board Certified by the National Committee on Certification of Physician Assistants  [facility]

## 2024-10-25 ENCOUNTER — OFFICE VISIT (OUTPATIENT)
Dept: ORTHOPEDIC SURGERY | Age: 64
End: 2024-10-25
Payer: MEDICARE

## 2024-10-25 VITALS — HEIGHT: 62 IN | RESPIRATION RATE: 16 BRPM | BODY MASS INDEX: 38.28 KG/M2 | WEIGHT: 208 LBS

## 2024-10-25 DIAGNOSIS — M17.12 PRIMARY OSTEOARTHRITIS OF LEFT KNEE: Primary | ICD-10-CM

## 2024-10-25 PROCEDURE — 20610 DRAIN/INJ JOINT/BURSA W/O US: CPT | Performed by: STUDENT IN AN ORGANIZED HEALTH CARE EDUCATION/TRAINING PROGRAM

## 2024-10-25 RX ORDER — BUPIVACAINE HYDROCHLORIDE 2.5 MG/ML
4 INJECTION, SOLUTION INFILTRATION; PERINEURAL ONCE
Status: COMPLETED | OUTPATIENT
Start: 2024-10-25 | End: 2024-10-25

## 2024-10-25 RX ORDER — TRIAMCINOLONE ACETONIDE 40 MG/ML
40 INJECTION, SUSPENSION INTRA-ARTICULAR; INTRAMUSCULAR ONCE
Status: COMPLETED | OUTPATIENT
Start: 2024-10-25 | End: 2024-10-25

## 2024-10-25 RX ORDER — LIDOCAINE HYDROCHLORIDE 10 MG/ML
4 INJECTION, SOLUTION INFILTRATION; PERINEURAL ONCE
Status: COMPLETED | OUTPATIENT
Start: 2024-10-25 | End: 2024-10-25

## 2024-10-25 RX ADMIN — LIDOCAINE HYDROCHLORIDE 4 ML: 10 INJECTION, SOLUTION INFILTRATION; PERINEURAL at 11:13

## 2024-10-25 RX ADMIN — BUPIVACAINE HYDROCHLORIDE 10 MG: 2.5 INJECTION, SOLUTION INFILTRATION; PERINEURAL at 11:12

## 2024-10-25 RX ADMIN — TRIAMCINOLONE ACETONIDE 40 MG: 40 INJECTION, SUSPENSION INTRA-ARTICULAR; INTRAMUSCULAR at 11:13

## 2024-10-25 NOTE — PROGRESS NOTES
PROCEDURE NOTE:    PRE-PROCEDURE DIAGNOSIS: Left knee osteoarthritis     POST-PROCEDURE DIAGNOSIS: Left knee osteoarthritis       PROCEDURE:  The risks and benefits of an injection were discussed with the patient.  The patient had full opportunity to ask questions and all were answered.  The patient then provided verbal informed consent.  The skin was then prepped with betadine solution and alcohol.  Under aseptic conditions, the  left knee was injected with 4cc of 1% xylocaine, 4cc of 0.25% marcaine, and 1cc of Kenalog (40mg/ml).  There were no immediate complications following the injection.  The patient was advised of the possibility of injection site reaction and instructed to apply ice to the area and take NSAIDs if able.         POST-PROCEDURE INSTRUCTIONS GIVEN TO PATIENT: The patient was advised to ice the knee for 15-20 minutes to relieve any injection site related pain.     FOLLOW-UP: 3 months prn.             CHYNA Hernandez, CHERI  Board Certified by the National Committee on Certification of Physician Assistants  [facility]

## 2024-11-22 DIAGNOSIS — M17.12 PRIMARY OSTEOARTHRITIS OF LEFT KNEE: ICD-10-CM

## 2024-11-22 RX ORDER — MELOXICAM 7.5 MG/1
7.5 TABLET ORAL DAILY
Qty: 30 TABLET | Refills: 2 | Status: SHIPPED | OUTPATIENT
Start: 2024-11-22

## 2024-12-16 ENCOUNTER — APPOINTMENT (OUTPATIENT)
Dept: URBAN - METROPOLITAN AREA CLINIC 170 | Facility: CLINIC | Age: 64
Setting detail: DERMATOLOGY
End: 2024-12-16

## 2024-12-16 DIAGNOSIS — L71.8 OTHER ROSACEA: ICD-10-CM | Status: IMPROVED

## 2024-12-16 DIAGNOSIS — L21.8 OTHER SEBORRHEIC DERMATITIS: ICD-10-CM | Status: IMPROVED

## 2024-12-16 PROCEDURE — ? MDM - TREATMENT GOALS

## 2024-12-16 PROCEDURE — ? FULL BODY SKIN EXAM - DECLINED

## 2024-12-16 PROCEDURE — ? COUNSELING

## 2024-12-16 PROCEDURE — ? PRESCRIPTION MEDICATION MANAGEMENT

## 2024-12-16 PROCEDURE — ? PRESCRIPTION

## 2024-12-16 PROCEDURE — 99214 OFFICE O/P EST MOD 30 MIN: CPT

## 2024-12-16 RX ORDER — KETOCONAZOLE 20 MG/ML
SHAMPOO, SUSPENSION TOPICAL
Qty: 120 | Refills: 5 | Status: ERX

## 2024-12-16 ASSESSMENT — LOCATION SIMPLE DESCRIPTION DERM
LOCATION SIMPLE: SCALP
LOCATION SIMPLE: LEFT CHEEK
LOCATION SIMPLE: NOSE
LOCATION SIMPLE: RIGHT CHEEK

## 2024-12-16 ASSESSMENT — LOCATION DETAILED DESCRIPTION DERM
LOCATION DETAILED: LEFT INFERIOR CENTRAL MALAR CHEEK
LOCATION DETAILED: LEFT SUPERIOR PARIETAL SCALP
LOCATION DETAILED: RIGHT INFERIOR CENTRAL MALAR CHEEK
LOCATION DETAILED: NASAL SUPRATIP

## 2024-12-16 ASSESSMENT — LOCATION ZONE DERM
LOCATION ZONE: FACE
LOCATION ZONE: SCALP
LOCATION ZONE: NOSE

## 2024-12-16 ASSESSMENT — SEVERITY ASSESSMENT: HOW SEVERE IS THIS PATIENT'S CONDITION?: ALMOST CLEAR

## 2024-12-16 NOTE — PROCEDURE: PRESCRIPTION MEDICATION MANAGEMENT
Render In Strict Bullet Format?: No
Continue Regimen: Metronidazole 0.75% cream bid
Detail Level: Detailed
Modify Regimen: Doxycycline 20mg QAM then take 20mg QPM with Lipitor instead of both doses at the same time.
Continue Regimen: Ketoconozole 2% shampoo [id number]
Detail Level: Simple

## 2024-12-16 NOTE — PROCEDURE: MIPS QUALITY
Quality 226: Preventive Care And Screening: Tobacco Use: Screening And Cessation Intervention: Patient screened for tobacco use and is an ex/non-smoker
Quality 47: Advance Care Plan: Advance Care Planning discussed and documented; advance care plan or surrogate decision maker documented in the medical record.
Quality 130: Documentation Of Current Medications In The Medical Record: Current Medications Documented
Quality 394a: Meningococcal Immunizations For Adolescents: Patient had one dose of meningococcal vaccine (serogroups A, C, W, Y) on or between the patient's 11th and 13th birthdays.
Detail Level: Detailed
Quality 431: Preventive Care And Screening: Unhealthy Alcohol Use - Screening: Patient not identified as an unhealthy alcohol user when screened for unhealthy alcohol use using a systematic screening method

## 2024-12-18 ENCOUNTER — APPOINTMENT (OUTPATIENT)
Dept: URBAN - METROPOLITAN AREA CLINIC 170 | Facility: CLINIC | Age: 64
Setting detail: DERMATOLOGY
End: 2024-12-18

## 2024-12-18 DIAGNOSIS — L71.8 OTHER ROSACEA: ICD-10-CM

## 2024-12-18 PROCEDURE — ? ORDER TESTS

## 2024-12-18 NOTE — PROCEDURE: ORDER TESTS
Expected Date Of Service: 12/18/2024
Billing Type: Third-Party Bill
Bill For Surgical Tray: no
Performing Laboratory: [id number]
Lab Facility: [id number]

## 2025-02-25 ENCOUNTER — OFFICE VISIT (OUTPATIENT)
Dept: ORTHOPEDIC SURGERY | Age: 65
End: 2025-02-25
Payer: MEDICARE

## 2025-02-25 VITALS — WEIGHT: 208 LBS | BODY MASS INDEX: 38.28 KG/M2 | HEIGHT: 62 IN | RESPIRATION RATE: 16 BRPM

## 2025-02-25 DIAGNOSIS — M17.12 PRIMARY OSTEOARTHRITIS OF LEFT KNEE: Primary | ICD-10-CM

## 2025-02-25 PROCEDURE — 20610 DRAIN/INJ JOINT/BURSA W/O US: CPT | Performed by: ORTHOPAEDIC SURGERY

## 2025-02-25 RX ORDER — BUPIVACAINE HYDROCHLORIDE 2.5 MG/ML
4 INJECTION, SOLUTION INFILTRATION; PERINEURAL ONCE
Status: COMPLETED | OUTPATIENT
Start: 2025-02-25 | End: 2025-02-25

## 2025-02-25 RX ORDER — LIDOCAINE HYDROCHLORIDE 10 MG/ML
4 INJECTION, SOLUTION INFILTRATION; PERINEURAL ONCE
Status: COMPLETED | OUTPATIENT
Start: 2025-02-25 | End: 2025-02-25

## 2025-02-25 RX ORDER — TRIAMCINOLONE ACETONIDE 40 MG/ML
40 INJECTION, SUSPENSION INTRA-ARTICULAR; INTRAMUSCULAR ONCE
Status: COMPLETED | OUTPATIENT
Start: 2025-02-25 | End: 2025-02-25

## 2025-02-25 RX ADMIN — TRIAMCINOLONE ACETONIDE 40 MG: 40 INJECTION, SUSPENSION INTRA-ARTICULAR; INTRAMUSCULAR at 09:46

## 2025-02-25 RX ADMIN — BUPIVACAINE HYDROCHLORIDE 10 MG: 2.5 INJECTION, SOLUTION INFILTRATION; PERINEURAL at 09:44

## 2025-02-25 RX ADMIN — LIDOCAINE HYDROCHLORIDE 4 ML: 10 INJECTION, SOLUTION INFILTRATION; PERINEURAL at 09:45

## 2025-02-25 NOTE — PROGRESS NOTES
PROCEDURE NOTE:    PRE-PROCEDURE DIAGNOSIS: Left knee osteoarthritis     POST-PROCEDURE DIAGNOSIS: Left knee osteoarthritis     Prior corticosteroid injection 10/25/24 lasted 2 weeks.  She also had viscosupplementation injection right around that time.    PROCEDURE:  The risks and benefits of an injection were discussed with the patient.  The patient had full opportunity to ask questions and all were answered.  The patient then provided verbal informed consent.  The skin was then prepped with betadine solution and alcohol.  Under aseptic conditions, the  left knee was injected with 4cc of 1% xylocaine, 4cc of 0.25% marcaine, and 1cc of Kenalog (40mg/ml).  There were no immediate complications following the injection.  The patient was advised of the possibility of injection site reaction and instructed to apply ice to the area and take NSAIDs if able.         POST-PROCEDURE INSTRUCTIONS GIVEN TO PATIENT: The patient was advised to ice the knee for 15-20 minutes to relieve any injection site related pain.     FOLLOW-UP: 3 months prn.         Jose Rausch MD  Orthopaedic Surgery and Sports Medicine

## 2025-03-03 DIAGNOSIS — M17.12 PRIMARY OSTEOARTHRITIS OF LEFT KNEE: ICD-10-CM

## 2025-03-04 RX ORDER — MELOXICAM 7.5 MG/1
7.5 TABLET ORAL DAILY
Qty: 30 TABLET | Refills: 2 | Status: SHIPPED | OUTPATIENT
Start: 2025-03-04

## 2025-05-07 ENCOUNTER — APPOINTMENT (OUTPATIENT)
Dept: URBAN - METROPOLITAN AREA CLINIC 170 | Facility: CLINIC | Age: 65
Setting detail: DERMATOLOGY
End: 2025-05-07

## 2025-05-07 DIAGNOSIS — D18.0 HEMANGIOMA: ICD-10-CM | Status: STABLE

## 2025-05-07 DIAGNOSIS — L71.8 OTHER ROSACEA: ICD-10-CM | Status: IMPROVED

## 2025-05-07 DIAGNOSIS — L81.4 OTHER MELANIN HYPERPIGMENTATION: ICD-10-CM | Status: STABLE

## 2025-05-07 DIAGNOSIS — L21.8 OTHER SEBORRHEIC DERMATITIS: ICD-10-CM | Status: IMPROVED

## 2025-05-07 DIAGNOSIS — L82.1 OTHER SEBORRHEIC KERATOSIS: ICD-10-CM | Status: STABLE

## 2025-05-07 DIAGNOSIS — Z80.8 FAMILY HISTORY OF MALIGNANT NEOPLASM OF OTHER ORGANS OR SYSTEMS: ICD-10-CM | Status: STABLE

## 2025-05-07 DIAGNOSIS — D22 MELANOCYTIC NEVI: ICD-10-CM | Status: STABLE

## 2025-05-07 PROBLEM — D18.01 HEMANGIOMA OF SKIN AND SUBCUTANEOUS TISSUE: Status: ACTIVE | Noted: 2025-05-07

## 2025-05-07 PROBLEM — D22.5 MELANOCYTIC NEVI OF TRUNK: Status: ACTIVE | Noted: 2025-05-07

## 2025-05-07 PROCEDURE — 99214 OFFICE O/P EST MOD 30 MIN: CPT

## 2025-05-07 PROCEDURE — ? COUNSELING

## 2025-05-07 PROCEDURE — ? PRESCRIPTION

## 2025-05-07 PROCEDURE — ? PRESCRIPTION MEDICATION MANAGEMENT

## 2025-05-07 PROCEDURE — ? TREATMENT REGIMEN

## 2025-05-07 PROCEDURE — ? FULL BODY SKIN EXAM

## 2025-05-07 PROCEDURE — ? MDM - TREATMENT GOALS

## 2025-05-07 RX ORDER — METRONIDAZOLE 7.5 MG/G
CREAM TOPICAL QD
Qty: 45 | Refills: 2 | Status: ERX

## 2025-05-07 RX ORDER — DOXYCYCLINE HYCLATE 20 MG
TABLET ORAL
Qty: 180 | Refills: 3 | Status: ERX

## 2025-05-07 ASSESSMENT — LOCATION SIMPLE DESCRIPTION DERM
LOCATION SIMPLE: RIGHT LOWER BACK
LOCATION SIMPLE: LEFT CHEEK
LOCATION SIMPLE: ABDOMEN
LOCATION SIMPLE: RIGHT UPPER BACK
LOCATION SIMPLE: SCALP
LOCATION SIMPLE: UPPER BACK
LOCATION SIMPLE: RIGHT CHEEK
LOCATION SIMPLE: NOSE

## 2025-05-07 ASSESSMENT — LOCATION DETAILED DESCRIPTION DERM
LOCATION DETAILED: INFERIOR THORACIC SPINE
LOCATION DETAILED: NASAL SUPRATIP
LOCATION DETAILED: RIGHT SUPERIOR UPPER BACK
LOCATION DETAILED: EPIGASTRIC SKIN
LOCATION DETAILED: RIGHT INFERIOR MEDIAL MIDBACK
LOCATION DETAILED: RIGHT INFERIOR CENTRAL MALAR CHEEK
LOCATION DETAILED: LEFT INFERIOR CENTRAL MALAR CHEEK
LOCATION DETAILED: LEFT SUPERIOR PARIETAL SCALP

## 2025-05-07 ASSESSMENT — LOCATION ZONE DERM
LOCATION ZONE: NOSE
LOCATION ZONE: SCALP
LOCATION ZONE: FACE
LOCATION ZONE: TRUNK

## 2025-05-07 ASSESSMENT — SEVERITY ASSESSMENT: HOW SEVERE IS THIS PATIENT'S CONDITION?: ALMOST CLEAR

## 2025-05-07 ASSESSMENT — SEVERITY ASSESSMENT OVERALL AMONG ALL PATIENTS
IN YOUR EXPERIENCE, AMONG ALL PATIENTS YOU HAVE SEEN WITH THIS CONDITION, HOW SEVERE IS THIS PATIENT'S CONDITION?: MILD TO MODERATE

## 2025-05-07 NOTE — PROCEDURE: COUNSELING
Detail Level: Generalized
Sunscreen Recommendations: SPF 50
Detail Level: Detailed
Detail Level: Simple
Topical Steroid Recommendations: Ok to use OTC HCT 1-2x daily x 1 week PRN flares

## 2025-05-07 NOTE — PROCEDURE: PRESCRIPTION MEDICATION MANAGEMENT
Render In Strict Bullet Format?: No
Continue Regimen: Metronidazole 0.75% cream bid, Doxy 20 mg BID
Detail Level: Detailed
Continue Regimen: Ketoconozole 2% shampoo [id number]
Plan: Patient will call for refills
Detail Level: Simple

## 2025-05-20 ENCOUNTER — OFFICE VISIT (OUTPATIENT)
Dept: ORTHOPEDIC SURGERY | Age: 65
End: 2025-05-20
Payer: MEDICARE

## 2025-05-20 VITALS — HEIGHT: 62 IN | WEIGHT: 215 LBS | BODY MASS INDEX: 39.56 KG/M2

## 2025-05-20 DIAGNOSIS — M17.12 PRIMARY OSTEOARTHRITIS OF LEFT KNEE: Primary | ICD-10-CM

## 2025-05-20 PROCEDURE — 20610 DRAIN/INJ JOINT/BURSA W/O US: CPT | Performed by: STUDENT IN AN ORGANIZED HEALTH CARE EDUCATION/TRAINING PROGRAM

## 2025-05-20 PROCEDURE — 99213 OFFICE O/P EST LOW 20 MIN: CPT | Performed by: STUDENT IN AN ORGANIZED HEALTH CARE EDUCATION/TRAINING PROGRAM

## 2025-05-20 PROCEDURE — 1125F AMNT PAIN NOTED PAIN PRSNT: CPT | Performed by: STUDENT IN AN ORGANIZED HEALTH CARE EDUCATION/TRAINING PROGRAM

## 2025-05-20 PROCEDURE — 1159F MED LIST DOCD IN RCRD: CPT | Performed by: STUDENT IN AN ORGANIZED HEALTH CARE EDUCATION/TRAINING PROGRAM

## 2025-05-20 PROCEDURE — 1160F RVW MEDS BY RX/DR IN RCRD: CPT | Performed by: STUDENT IN AN ORGANIZED HEALTH CARE EDUCATION/TRAINING PROGRAM

## 2025-05-20 PROCEDURE — 1123F ACP DISCUSS/DSCN MKR DOCD: CPT | Performed by: STUDENT IN AN ORGANIZED HEALTH CARE EDUCATION/TRAINING PROGRAM

## 2025-05-20 RX ORDER — LIDOCAINE HYDROCHLORIDE 10 MG/ML
4 INJECTION, SOLUTION INFILTRATION; PERINEURAL ONCE
Status: COMPLETED | OUTPATIENT
Start: 2025-05-20 | End: 2025-05-20

## 2025-05-20 RX ORDER — BUPIVACAINE HYDROCHLORIDE 2.5 MG/ML
4 INJECTION, SOLUTION INFILTRATION; PERINEURAL ONCE
Status: COMPLETED | OUTPATIENT
Start: 2025-05-20 | End: 2025-05-20

## 2025-05-20 RX ORDER — TRIAMCINOLONE ACETONIDE 40 MG/ML
40 INJECTION, SUSPENSION INTRA-ARTICULAR; INTRAMUSCULAR ONCE
Status: COMPLETED | OUTPATIENT
Start: 2025-05-20 | End: 2025-05-20

## 2025-05-20 RX ADMIN — LIDOCAINE HYDROCHLORIDE 4 ML: 10 INJECTION, SOLUTION INFILTRATION; PERINEURAL at 09:28

## 2025-05-20 RX ADMIN — BUPIVACAINE HYDROCHLORIDE 10 MG: 2.5 INJECTION, SOLUTION INFILTRATION; PERINEURAL at 09:27

## 2025-05-20 RX ADMIN — TRIAMCINOLONE ACETONIDE 40 MG: 40 INJECTION, SUSPENSION INTRA-ARTICULAR; INTRAMUSCULAR at 09:28

## 2025-05-20 NOTE — PROGRESS NOTES
CHIEF COMPLAINT: Left knee pain.      History:   Nasreen Lubin is a 64 y.o. female self-referred for evaluation and treatment of left knee pain / injury. The patient complains of left knee pain. This is evaluated as a personal injury. The pain began 3 years ago. She used to see Dr. Nicholson for knee osteoarthritis. She had cortisone injection which did not provide relief. She then had a Durolane injection which provided 3 years of relief.  Rate pain 7/10. There was not a history of injury. Her knee started bothering her again two weeks ago.   The pain is located medial joint line and posterior. It is painful with weight bearing activities.   The knee has not given out or felt unstable. The patient can bend and straighten the knee fully. There is no swelling in the knee. There was not catching / locking of the knee. The patient has not had PT. The patient has had an injection. The patient has taken NSAIDs. The patient has tried ice.     Interval history: The patient states the last cortisone injection only lasted 3 weeks. Her pain is worsening. Pain is located at the medial aspect of the knee.       Past Medical History:   Diagnosis Date    Hyperlipidemia     Thyroid disease        Past Surgical History:   Procedure Laterality Date     SECTION      x2    CHOLECYSTECTOMY      CYSTOSCOPY  13    CYSTOSCOPY URETHRAL DILATATION HYDRODISTENTION OF BLADDER    KIDNEY REMOVAL Left     PAIN MANAGEMENT PROCEDURE Right 10/19/2020    RIGHT L4 AND L5 TRANSFORAMINAL EPIDURAL STEROID INJECTION WITH FLUOROSCOPY (58229, 48923) performed by Sana Estes MD at [facility]    PAIN MANAGEMENT PROCEDURE Right 2020    RIGHT L4 AND L5 TRANSFORAMINAL EPIDURAL STEROID INJECTION WITH FLUOROSCOPY (56051, 31556) performed by Sana Estes MD at [facility]    TUBAL LIGATION         Family History   Problem Relation Age of Onset    Diabetes type 2  Mother     Cancer Father        Social History     Socioeconomic History

## 2025-07-07 ENCOUNTER — OFFICE VISIT (OUTPATIENT)
Dept: ORTHOPEDIC SURGERY | Age: 65
End: 2025-07-07
Payer: MEDICARE

## 2025-07-07 DIAGNOSIS — M25.551 RIGHT HIP PAIN: ICD-10-CM

## 2025-07-07 DIAGNOSIS — M17.12 PRIMARY OSTEOARTHRITIS OF LEFT KNEE: Primary | ICD-10-CM

## 2025-07-07 PROCEDURE — 1159F MED LIST DOCD IN RCRD: CPT | Performed by: ORTHOPAEDIC SURGERY

## 2025-07-07 PROCEDURE — 99214 OFFICE O/P EST MOD 30 MIN: CPT | Performed by: ORTHOPAEDIC SURGERY

## 2025-07-07 PROCEDURE — 1123F ACP DISCUSS/DSCN MKR DOCD: CPT | Performed by: ORTHOPAEDIC SURGERY

## 2025-07-07 NOTE — PROGRESS NOTES
Patient: Nasreen Lubin  : 1960    MRN: [id number]    Date of Visit: 25    Attending Physician: [place]    History of Present Illness  Ms.. Lubin is a very pleasant 65 y.o. patient with a several month history of progressive LEFT knee pain. There is no precipitating event or trauma. The pain is located predominantly in the medial and anterior aspect of the knee aggravated by weight bearing. Walking even short distances can be painful. Stair climbing is progressing becoming more difficult and painful. However, it can also awaken the patient at night. She has tried the following interventions without sustained functional improvement:    Low-impact, structured therapy/exercise program  NSAID's/Tylenol Arthritis strength  Cortisone injections/viscosupplementation   Knee brace  Cane for ambulation    Quality of life is negatively impacted with daily tasks being more difficult. The patient would like to talk about surgical treatment options.  She continues to have right hip pain without improvement.    PMH/PSH:  Past Medical History:   Diagnosis Date    Hyperlipidemia     Thyroid disease      Patient Active Problem List   Diagnosis    Primary osteoarthritis of right hip    Hip strain, right, subsequent encounter    Bilateral primary osteoarthritis of hip    Pain, radicular, lumbar    Primary osteoarthritis of left knee     Past Surgical History:   Procedure Laterality Date     SECTION      x2    CHOLECYSTECTOMY      CYSTOSCOPY  13    CYSTOSCOPY URETHRAL DILATATION HYDRODISTENTION OF BLADDER    KIDNEY REMOVAL Left     PAIN MANAGEMENT PROCEDURE Right 10/19/2020    RIGHT L4 AND L5 TRANSFORAMINAL EPIDURAL STEROID INJECTION WITH FLUOROSCOPY (05634, 23695) performed by Sana Estes MD at [facility]    PAIN MANAGEMENT PROCEDURE Right 2020    RIGHT L4 AND L5 TRANSFORAMINAL EPIDURAL STEROID INJECTION WITH FLUOROSCOPY (76552, 37227) performed by Sana Estes MD at [facility]

## 2025-07-09 RX ORDER — SODIUM CHLORIDE 0.9 % (FLUSH) 0.9 %
5-40 SYRINGE (ML) INJECTION EVERY 12 HOURS SCHEDULED
OUTPATIENT
Start: 2025-07-09

## 2025-07-09 RX ORDER — SODIUM CHLORIDE 0.9 % (FLUSH) 0.9 %
5-40 SYRINGE (ML) INJECTION PRN
OUTPATIENT
Start: 2025-07-09

## 2025-07-09 RX ORDER — SODIUM CHLORIDE 9 MG/ML
INJECTION, SOLUTION INTRAVENOUS PRN
OUTPATIENT
Start: 2025-07-09

## 2025-07-09 RX ORDER — CELECOXIB 100 MG/1
400 CAPSULE ORAL ONCE
OUTPATIENT
Start: 2025-07-09 | End: 2025-07-09

## 2025-07-09 RX ORDER — ACETAMINOPHEN 325 MG/1
1000 TABLET ORAL ONCE
OUTPATIENT
Start: 2025-07-09 | End: 2025-07-09

## 2025-07-10 NOTE — TELEPHONE ENCOUNTER
Changed
General Question     Subject: LEFT KNEE RIGHT HIP  Patient and /or Facility Request: MARYANN GLASS  Contact Number:[phone]    IN TODAY'S OFFICE NOTES, ANABEL STATED PATIENT WAS BEING TREATED FOR HER LEFT HIP WHEN IT'S HER RIGHT HIP. 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
Please advise, in case you need to edit your dictation.
Supprelin placement

## 2025-07-16 ENCOUNTER — TELEPHONE (OUTPATIENT)
Dept: ORTHOPEDIC SURGERY | Age: 65
End: 2025-07-16

## 2025-07-17 ENCOUNTER — TELEPHONE (OUTPATIENT)
Dept: ORTHOPEDIC SURGERY | Age: 65
End: 2025-07-17

## 2025-09-05 ENCOUNTER — HOSPITAL ENCOUNTER (OUTPATIENT)
Age: 65
Discharge: HOME | End: 2025-09-05
Payer: MEDICARE

## 2025-09-05 DIAGNOSIS — M17.12 PRIMARY OSTEOARTHRITIS OF LEFT KNEE: ICD-10-CM

## 2025-09-05 LAB
APTT BLD: 26.8 SEC (ref 22.8–35.8)
EKG ATRIAL RATE: 63 BPM
EKG DIAGNOSIS: NORMAL
EKG P AXIS: 63 DEGREES
EKG P-R INTERVAL: 156 MS
EKG Q-T INTERVAL: 410 MS
EKG QRS DURATION: 82 MS
EKG QTC CALCULATION (BAZETT): 419 MS
EKG R AXIS: 56 DEGREES
EKG T AXIS: 83 DEGREES
EKG VENTRICULAR RATE: 63 BPM
INR PPP: 0.99 (ref 0.86–1.14)
PROTHROMBIN TIME: 13.4 SEC (ref 12.1–14.9)

## 2025-09-05 PROCEDURE — 80053 COMPREHEN METABOLIC PANEL: CPT

## 2025-09-05 PROCEDURE — 87081 CULTURE SCREEN ONLY: CPT

## 2025-09-05 PROCEDURE — 85025 COMPLETE CBC W/AUTO DIFF WBC: CPT

## 2025-09-05 PROCEDURE — 85730 THROMBOPLASTIN TIME PARTIAL: CPT

## 2025-09-05 PROCEDURE — 83036 HEMOGLOBIN GLYCOSYLATED A1C: CPT

## 2025-09-05 PROCEDURE — 85610 PROTHROMBIN TIME: CPT

## 2025-09-05 PROCEDURE — 36415 COLL VENOUS BLD VENIPUNCTURE: CPT

## 2025-09-06 LAB
ALBUMIN SERPL-MCNC: 4.2 G/DL (ref 3.4–5)
ALBUMIN/GLOB SERPL: 1.7 {RATIO} (ref 1.1–2.2)
ALP SERPL-CCNC: 57 U/L (ref 40–129)
ALT SERPL-CCNC: 25 U/L (ref 10–40)
ANION GAP SERPL CALCULATED.3IONS-SCNC: 11 MMOL/L (ref 3–16)
AST SERPL-CCNC: 24 U/L (ref 15–37)
BASOPHILS # BLD: 0 K/UL (ref 0–0.2)
BASOPHILS NFR BLD: 1.2 %
BILIRUB SERPL-MCNC: 1.1 MG/DL (ref 0–1)
BUN SERPL-MCNC: 13 MG/DL (ref 7–20)
CALCIUM SERPL-MCNC: 9.1 MG/DL (ref 8.3–10.6)
CHLORIDE SERPL-SCNC: 105 MMOL/L (ref 99–110)
CO2 SERPL-SCNC: 24 MMOL/L (ref 21–32)
CREAT SERPL-MCNC: 1.1 MG/DL (ref 0.6–1.2)
DEPRECATED RDW RBC AUTO: 13.3 % (ref 12.4–15.4)
EOSINOPHIL # BLD: 0.1 K/UL (ref 0–0.6)
EOSINOPHIL NFR BLD: 3 %
EST. AVERAGE GLUCOSE BLD GHB EST-MCNC: 122.6 MG/DL
GFR SERPLBLD CREATININE-BSD FMLA CKD-EPI: 55 ML/MIN/{1.73_M2}
GLUCOSE SERPL-MCNC: 111 MG/DL (ref 70–99)
HBA1C MFR BLD: 5.9 %
HCT VFR BLD AUTO: 40.6 % (ref 36–48)
HGB BLD-MCNC: 14 G/DL (ref 12–16)
LYMPHOCYTES # BLD: 1.4 K/UL (ref 1–5.1)
LYMPHOCYTES NFR BLD: 39.7 %
MCH RBC QN AUTO: 32.4 PG (ref 26–34)
MCHC RBC AUTO-ENTMCNC: 34.4 G/DL (ref 31–36)
MCV RBC AUTO: 94.4 FL (ref 80–100)
MONOCYTES # BLD: 0.6 K/UL (ref 0–1.3)
MONOCYTES NFR BLD: 15.4 %
NEUTROPHILS # BLD: 1.5 K/UL (ref 1.7–7.7)
NEUTROPHILS NFR BLD: 40.7 %
PLATELET # BLD AUTO: 162 K/UL (ref 135–450)
PMV BLD AUTO: 11.9 FL (ref 5–10.5)
POTASSIUM SERPL-SCNC: 4.2 MMOL/L (ref 3.5–5.1)
PROT SERPL-MCNC: 6.7 G/DL (ref 6.4–8.2)
RBC # BLD AUTO: 4.3 M/UL (ref 4–5.2)
SODIUM SERPL-SCNC: 140 MMOL/L (ref 136–145)
WBC # BLD AUTO: 3.6 K/UL (ref 4–11)

## 2025-09-07 LAB — MRSA SPEC QL CULT: NORMAL

## (undated) DEVICE — Device: Brand: JELCO

## (undated) DEVICE — STERILE POLYISOPRENE POWDER-FREE SURGICAL GLOVES: Brand: PROTEXIS

## (undated) DEVICE — NEEDLE SPNL 22GA L5IN BLK HUB S STL W/ QNCKE PNT W/OUT

## (undated) DEVICE — SYRINGE MED 3ML CLR PLAS STD N CTRL LUERLOCK TIP DISP

## (undated) DEVICE — UNIVERSAL BLOCK TRAY: Brand: AVANOS*

## (undated) DEVICE — PEN: MARKING STD 100/CS: Brand: MEDICAL ACTION INDUSTRIES

## (undated) DEVICE — NEEDLE SPNL 22GA L3.5IN BLK HUB S STL REG WALL FIT STYL W/

## (undated) DEVICE — DISPOSABLE OR TOWEL: Brand: CARDINAL HEALTH

## (undated) DEVICE — MEDIA CONTRAST RX ISOVUE-300 61% 30ML VIALS

## (undated) DEVICE — CHLORAPREP 26ML ORANGE

## (undated) DEVICE — STANDARD HYPODERMIC NEEDLE,POLYPROPYLENE HUB: Brand: MONOJECT